# Patient Record
Sex: FEMALE | Race: ASIAN | Employment: FULL TIME | URBAN - METROPOLITAN AREA
[De-identification: names, ages, dates, MRNs, and addresses within clinical notes are randomized per-mention and may not be internally consistent; named-entity substitution may affect disease eponyms.]

---

## 2017-01-05 NOTE — PROGRESS NOTES
[facility] Internal Medicine Office Note  Chief Complaint:   Patient presents with: Follow - Up: 1 month for alprazelam.       HPI:   This is a 27year old female coming in for follow up for anxiety. She is doing well. She took alprazolam twice. kg/(m^2) as calculated from the following:    Height as of this encounter: 61\". Weight as of this encounter: 123 lb. Vital signs reviewed. Appears stated age, well groomed. Physical Exam   Vitals reviewed.    Constitutional: She appears well-develope

## 2017-07-27 ENCOUNTER — TELEPHONE (OUTPATIENT)
Dept: FAMILY MEDICINE CLINIC | Facility: CLINIC | Age: 32
End: 2017-07-27

## 2017-07-27 NOTE — TELEPHONE ENCOUNTER
Received completed Biometric screening from [facility], faxed to [phone], confirmation received     Copy mailed to pt and sent to scanning

## 2017-12-18 ENCOUNTER — OFFICE VISIT (OUTPATIENT)
Dept: FAMILY MEDICINE CLINIC | Facility: CLINIC | Age: 32
End: 2017-12-18

## 2017-12-18 ENCOUNTER — TELEPHONE (OUTPATIENT)
Dept: FAMILY MEDICINE CLINIC | Facility: CLINIC | Age: 32
End: 2017-12-18

## 2017-12-18 VITALS
TEMPERATURE: 98 F | HEART RATE: 62 BPM | DIASTOLIC BLOOD PRESSURE: 60 MMHG | HEIGHT: 62 IN | BODY MASS INDEX: 22.26 KG/M2 | WEIGHT: 121 LBS | RESPIRATION RATE: 16 BRPM | SYSTOLIC BLOOD PRESSURE: 100 MMHG

## 2017-12-18 DIAGNOSIS — Z00.00 LABORATORY EXAM ORDERED AS PART OF ROUTINE GENERAL MEDICAL EXAMINATION: ICD-10-CM

## 2017-12-18 DIAGNOSIS — F51.04 PSYCHOPHYSIOLOGICAL INSOMNIA: ICD-10-CM

## 2017-12-18 DIAGNOSIS — K12.0 CANKER SORE: ICD-10-CM

## 2017-12-18 DIAGNOSIS — Z30.09 FAMILY PLANNING: ICD-10-CM

## 2017-12-18 DIAGNOSIS — Z00.00 ROUTINE MEDICAL EXAM: Primary | ICD-10-CM

## 2017-12-18 DIAGNOSIS — G43.009 MIGRAINE WITHOUT AURA AND WITHOUT STATUS MIGRAINOSUS, NOT INTRACTABLE: ICD-10-CM

## 2017-12-18 PROCEDURE — 99385 PREV VISIT NEW AGE 18-39: CPT | Performed by: FAMILY MEDICINE

## 2017-12-18 RX ORDER — VITAMIN A, VITAMIN C, VITAMIN D-3, VITAMIN E, VITAMIN B-1, VITAMIN B-2, NIACIN, VITAMIN B-6, CALCIUM, IRON, ZINC, COPPER 4000; 120; 400; 22; 1.84; 3; 20; 10; 1; 12; 200; 27; 25; 2 [IU]/1; MG/1; [IU]/1; MG/1; MG/1; MG/1; MG/1; MG/1; MG/1; UG/1; MG/1; MG/1; MG/1; MG/1
1 TABLET ORAL DAILY
Qty: 30 TABLET | Refills: 0 | COMMUNITY
Start: 2017-12-18 | End: 2018-01-17

## 2017-12-18 RX ORDER — SUMATRIPTAN 25 MG/1
TABLET, FILM COATED ORAL
Qty: 9 TABLET | Refills: 0 | Status: SHIPPED | OUTPATIENT
Start: 2017-12-18 | End: 2019-02-07

## 2017-12-18 RX ORDER — A/SINGAPORE/GP1908/2015 IVR-180 (H1N1) (AN A/MICHIGAN/45/2015-LIKE VIRUS), A/SINGAPORE/GP2050/2015 (H3N2) (AN A/HONG KONG/4801/2014 - LIKE VIRUS), B/UTAH/9/2014 (A B/PHUKET/3073/2013-LIKE VIRUS), B/HONG KONG/259/2010 (A B/BRISBANE/60/08-LIKE VIRUS) 15; 15; 15; 15 UG/.5ML; UG/.5ML; UG/.5ML; UG/.5ML
INJECTION, SUSPENSION INTRAMUSCULAR
COMMUNITY
Start: 2017-09-30 | End: 2017-12-18

## 2017-12-18 RX ORDER — ALPRAZOLAM 0.25 MG/1
0.25 TABLET ORAL NIGHTLY PRN
Qty: 30 TABLET | Refills: 1 | Status: SHIPPED | OUTPATIENT
Start: 2017-12-18 | End: 2018-11-12

## 2017-12-18 RX ORDER — ACYCLOVIR 400 MG/1
400 TABLET ORAL 3 TIMES DAILY
Qty: 45 TABLET | Refills: 1 | Status: SHIPPED | OUTPATIENT
Start: 2017-12-18 | End: 2019-02-07

## 2017-12-18 NOTE — TELEPHONE ENCOUNTER
Patient was seen in the office today for physical, she brought in Biometric Screening Form to be completed. Fasting labs ordered today, form will be completed once labs resulted. Form placed in the pending paperwork file in triage.

## 2017-12-18 NOTE — PROGRESS NOTES
Patient presents with:  Physical: patient brought in Biometric Screening form to be completed. HPI:  Sharon Marie is a 34year old female here today for preventative visit.       Immunizations–patient is very up-to-date with her flu shot    [facility] facility-administered medications on file prior to visit.      Penicillins             Hives    Social History  Social History   Marital status: Single  Spouse name: N/A    Years of education: N/A  Number of children: N/A     Occupational History  None on f nondistended. No hepatomegaly. MUSCULOSKELETAL: Strength of upper and lower extremities 5/5 bilaterally. Normal gait. NEUROLOGIC:  Cranial nerves 2-12 grossly intact. PSYCHIATRIC:  Normal mood, affect, and hygiene. ASSESSMENT/PLAN:    1.  Routine med

## 2017-12-27 NOTE — TELEPHONE ENCOUNTER
Labs not completed -contacted pt and informed her that she needs to complete the labs in order for us to complete her paperwork, pt states she will complete them tomorrow

## 2017-12-28 ENCOUNTER — LAB ENCOUNTER (OUTPATIENT)
Dept: LAB | Age: 32
End: 2017-12-28
Attending: FAMILY MEDICINE
Payer: COMMERCIAL

## 2017-12-28 DIAGNOSIS — Z00.00 LABORATORY EXAM ORDERED AS PART OF ROUTINE GENERAL MEDICAL EXAMINATION: ICD-10-CM

## 2017-12-28 PROCEDURE — 80061 LIPID PANEL: CPT | Performed by: FAMILY MEDICINE

## 2017-12-28 PROCEDURE — 80053 COMPREHEN METABOLIC PANEL: CPT | Performed by: FAMILY MEDICINE

## 2017-12-28 PROCEDURE — 36415 COLL VENOUS BLD VENIPUNCTURE: CPT | Performed by: FAMILY MEDICINE

## 2017-12-29 NOTE — TELEPHONE ENCOUNTER
Form completed and faxed to [phone] [facility], confirmation received   PT aware, requested copy mailed to home address, confirmed address w/ pt.  Copy mailed    Copy sent to scan  Copy placed in green folder

## 2018-01-03 ENCOUNTER — OFFICE VISIT (OUTPATIENT)
Dept: OBGYN CLINIC | Facility: CLINIC | Age: 33
End: 2018-01-03

## 2018-01-03 VITALS
HEIGHT: 61.5 IN | SYSTOLIC BLOOD PRESSURE: 104 MMHG | DIASTOLIC BLOOD PRESSURE: 62 MMHG | WEIGHT: 122 LBS | BODY MASS INDEX: 22.74 KG/M2

## 2018-01-03 DIAGNOSIS — Z12.4 CERVICAL CANCER SCREENING: ICD-10-CM

## 2018-01-03 DIAGNOSIS — Z12.39 BREAST CANCER SCREENING: ICD-10-CM

## 2018-01-03 DIAGNOSIS — Z01.419 WELL WOMAN EXAM: Primary | ICD-10-CM

## 2018-01-03 PROCEDURE — 99395 PREV VISIT EST AGE 18-39: CPT | Performed by: OBSTETRICS & GYNECOLOGY

## 2018-01-03 NOTE — PROGRESS NOTES
GYN H&P     1/3/2018  10:42 AM    CC: Patient presents with:  Physical: Room 4. HPI: patient is a 34year old  here for her annual gyne exam.      Menses are regular 35-42 days, has not yet tried ovualtion kits.  Pt has not taken ocp in 1 yr, o Father      does have health issues but they are unknown   • Seizure Disorder Brother      intellectual issues   • Neurological Disorder Mother      neuropathy       Social History  Social History   Marital status: Single  Spouse name: N/A    Years of educ auscultation  CARDIOVASCULAR: normal S1, S2, RRR  BREASTS: soft, nontendder, no palpable masses or nodes, no nipple discharge, no skin changes, no axillary adenopathy  ABDOMEN: Soft, non distended; non tender, no masses  GYNE/:                        Ext

## 2018-01-07 ENCOUNTER — PATIENT MESSAGE (OUTPATIENT)
Dept: FAMILY MEDICINE CLINIC | Facility: CLINIC | Age: 33
End: 2018-01-07

## 2018-01-08 NOTE — TELEPHONE ENCOUNTER
From: Alon Suresh  To: Cooper Whiting MD  Sent: 1/7/2018 3:19 PM CST  Subject: Non-Urgent Medical Question    Dear Dr. Gabriel Kaiser,    When I saw Dr. Serjio Lino for my annual gynecological exam, she mentioned I have a hemorrhoid.  I didn't notice i

## 2018-02-04 ENCOUNTER — HOSPITAL ENCOUNTER (OUTPATIENT)
Age: 33
Discharge: HOME | End: 2018-02-04
Payer: COMMERCIAL

## 2018-02-04 VITALS
RESPIRATION RATE: 16 BRPM | SYSTOLIC BLOOD PRESSURE: 110 MMHG | OXYGEN SATURATION: 100 % | BODY MASS INDEX: 23 KG/M2 | HEART RATE: 64 BPM | DIASTOLIC BLOOD PRESSURE: 75 MMHG | TEMPERATURE: 98 F | WEIGHT: 122 LBS

## 2018-02-04 DIAGNOSIS — J06.9 UPPER RESPIRATORY TRACT INFECTION, UNSPECIFIED TYPE: ICD-10-CM

## 2018-02-04 DIAGNOSIS — J02.9 PHARYNGITIS, UNSPECIFIED ETIOLOGY: Primary | ICD-10-CM

## 2018-02-04 LAB — POCT RAPID STREP: NEGATIVE

## 2018-02-04 PROCEDURE — 99214 OFFICE O/P EST MOD 30 MIN: CPT

## 2018-02-04 PROCEDURE — 99203 OFFICE O/P NEW LOW 30 MIN: CPT

## 2018-02-04 PROCEDURE — 87081 CULTURE SCREEN ONLY: CPT | Performed by: PHYSICIAN ASSISTANT

## 2018-02-04 PROCEDURE — 87430 STREP A AG IA: CPT | Performed by: PHYSICIAN ASSISTANT

## 2018-02-04 NOTE — ED PROVIDER NOTES
Patient Seen in: THE [facility] [place]    History   Patient presents with:  Sore Throat    Stated Complaint: SORE THROAT/COUGH/FEVER X 3 DAYS     HPI    1year-old female here with complaint of postnasal drip sore throat cough with low-gra Constitutional: She is oriented to person, place, and time. She appears well-developed and well-nourished. HENT:   Head: Normocephalic and atraumatic.    Right Ear: Tympanic membrane, external ear and ear canal normal.   Left Ear: Tympanic membrane, ext diagnosis)  Upper respiratory tract infection, unspecified type    Disposition:  Discharge  2/4/2018 11:58 am    Follow-up:  Roseline Ques[address]  [address]  [phone]    Schedule an appointment as soon as poss

## 2018-02-06 ENCOUNTER — MED REC SCAN ONLY (OUTPATIENT)
Dept: FAMILY MEDICINE CLINIC | Facility: CLINIC | Age: 33
End: 2018-02-06

## 2018-05-04 ENCOUNTER — OFFICE VISIT (OUTPATIENT)
Dept: FAMILY MEDICINE CLINIC | Facility: CLINIC | Age: 33
End: 2018-05-04

## 2018-05-04 VITALS
TEMPERATURE: 97 F | SYSTOLIC BLOOD PRESSURE: 100 MMHG | WEIGHT: 125 LBS | HEART RATE: 62 BPM | BODY MASS INDEX: 23.3 KG/M2 | RESPIRATION RATE: 16 BRPM | HEIGHT: 61.5 IN | DIASTOLIC BLOOD PRESSURE: 60 MMHG

## 2018-05-04 DIAGNOSIS — K64.9 HEMORRHOIDS, UNSPECIFIED HEMORRHOID TYPE: ICD-10-CM

## 2018-05-04 DIAGNOSIS — S86.919A STRAIN OF KNEE, UNSPECIFIED LATERALITY, INITIAL ENCOUNTER: Primary | ICD-10-CM

## 2018-05-04 PROCEDURE — 99213 OFFICE O/P EST LOW 20 MIN: CPT | Performed by: FAMILY MEDICINE

## 2018-05-04 NOTE — PATIENT INSTRUCTIONS
Back Exercises: Knee Lift         To start, lie on your back with your knees bent and feet flat on the floor. Don’t press your neck or lower back to the floor. Breathe deeply.  You should feel comfortable and relaxed in this position:  · Start by tighteni · Don’t arch your back. · Stand with both feet flat on the floor, shoulder-width apart. · If you need support, steady yourself with your hand on a ledge, wall, or table. · Raise both heels so you’re standing on your toes. · Hold for ______ seconds.  Sl Begin your rehabilitation with exercises that develop muscle control. These help you meet basic goals, like driving a car or going back to work. Exercise as often as you’re advised. But stop right away if any exercise causes sharp or increasing pain.  Icing

## 2018-05-04 NOTE — PROGRESS NOTES
[facility] Visit Note  5/4/2018      Subjective:      Patient ID: Nate Kulkarni is a 28year old female. Chief Complaint:  Patient presents with:  Knee Pain: pain in both knees x 2 & 1/2 wks.  States she did start new work out plan 3/2018 th

## 2018-10-01 ENCOUNTER — MED REC SCAN ONLY (OUTPATIENT)
Dept: FAMILY MEDICINE CLINIC | Facility: CLINIC | Age: 33
End: 2018-10-01

## 2018-11-12 DIAGNOSIS — F51.04 PSYCHOPHYSIOLOGICAL INSOMNIA: ICD-10-CM

## 2018-11-12 NOTE — TELEPHONE ENCOUNTER
Requesting Alprazolam  LOV: 5/4/18  RTC: not noted  Last Relevant Labs: n/a  Filled: 12/18/17 #30 with 1 refills    Future Appointments   Date Time Provider Tee Meneses   1/17/2019 12:00 PM Darian Kapoor MD [facility] [address]     No rec

## 2018-11-13 RX ORDER — ALPRAZOLAM 0.25 MG/1
0.25 TABLET ORAL NIGHTLY PRN
Qty: 30 TABLET | Refills: 0 | Status: SHIPPED
Start: 2018-11-13 | End: 2019-02-07

## 2018-12-26 ENCOUNTER — TELEPHONE (OUTPATIENT)
Dept: OBGYN CLINIC | Facility: CLINIC | Age: 33
End: 2018-12-26

## 2018-12-26 NOTE — TELEPHONE ENCOUNTER
LMP: Nov 16  Ins: Ronal    Call back anytime    Pt has appt scheduled for her annual on   Future Appointments   Date Time Provider Tee Meneses   1/17/2019  3:00 PM Drew Pang MD [facility] [address]     Wondering if she can keep this appt

## 2019-01-15 ENCOUNTER — NURSE ONLY (OUTPATIENT)
Dept: OBGYN CLINIC | Facility: CLINIC | Age: 34
End: 2019-01-15
Payer: COMMERCIAL

## 2019-01-15 ENCOUNTER — OFFICE VISIT (OUTPATIENT)
Dept: OBGYN CLINIC | Facility: CLINIC | Age: 34
End: 2019-01-15
Payer: COMMERCIAL

## 2019-01-15 VITALS
DIASTOLIC BLOOD PRESSURE: 56 MMHG | BODY MASS INDEX: 24.6 KG/M2 | HEART RATE: 62 BPM | WEIGHT: 132 LBS | SYSTOLIC BLOOD PRESSURE: 100 MMHG | HEIGHT: 61.5 IN

## 2019-01-15 DIAGNOSIS — N91.1 SECONDARY AMENORRHEA: Primary | ICD-10-CM

## 2019-01-15 DIAGNOSIS — Z32.01 PREGNANCY EXAMINATION OR TEST, POSITIVE RESULT: ICD-10-CM

## 2019-01-15 PROCEDURE — 99213 OFFICE O/P EST LOW 20 MIN: CPT | Performed by: OBSTETRICS & GYNECOLOGY

## 2019-01-15 PROCEDURE — 76817 TRANSVAGINAL US OBSTETRIC: CPT | Performed by: OBSTETRICS & GYNECOLOGY

## 2019-01-15 RX ORDER — GLYCERIN/MINERAL OIL
1 LOTION (ML) TOPICAL DAILY
COMMUNITY

## 2019-01-15 NOTE — PROGRESS NOTES
Confirmation of pregnancy  Transvaginal  edc by lmp of 11/16/18 is 8/23/19 at [address] [place],9D  edc by u/s today 8/25/19  crl 7w6d  fhr 161  + yolk sac, adnexa nl, no free fluid    Sure lmp-EDC 08/23/19

## 2019-01-15 NOTE — PATIENT INSTRUCTIONS
Medications Safe in Pregnancy  The following over-the-counter medications may be taken safely after 12 weeks gestation by any patient who is pregnant. Please follow the instructions on the package for adult dosage.   If you experience any symptoms merced

## 2019-01-15 NOTE — PROGRESS NOTES
[address] [facility]  Obstetrics and Gynecology    Subjective:     Antonette Abbasi is a 28year old  female presents with c/o secondary amenorrhea and positive pregnancy test. The patient was recommended to return for further evaluation.  The patien All of the findings and plan were discussed with the patient. She notes understanding and agrees with the plan of care. All questions were answered to the best of my ability at this time.     RTC in 3 weeks or sooner if needed

## 2019-02-07 ENCOUNTER — INITIAL PRENATAL (OUTPATIENT)
Dept: OBGYN CLINIC | Facility: CLINIC | Age: 34
End: 2019-02-07
Payer: COMMERCIAL

## 2019-02-07 ENCOUNTER — LAB ENCOUNTER (OUTPATIENT)
Dept: LAB | Age: 34
End: 2019-02-07
Attending: FAMILY MEDICINE
Payer: COMMERCIAL

## 2019-02-07 VITALS
DIASTOLIC BLOOD PRESSURE: 64 MMHG | WEIGHT: 137 LBS | SYSTOLIC BLOOD PRESSURE: 102 MMHG | BODY MASS INDEX: 25.86 KG/M2 | HEIGHT: 61 IN | HEART RATE: 68 BPM

## 2019-02-07 DIAGNOSIS — O09.91 HIGH-RISK PREGNANCY IN FIRST TRIMESTER: ICD-10-CM

## 2019-02-07 DIAGNOSIS — O09.511 AMA (ADVANCED MATERNAL AGE) PRIMIGRAVIDA 35+, FIRST TRIMESTER: Primary | ICD-10-CM

## 2019-02-07 LAB
ANTIBODY SCREEN: NEGATIVE
BASOPHILS # BLD AUTO: 0.03 X10(3) UL (ref 0–0.2)
BASOPHILS NFR BLD AUTO: 0.4 %
DEPRECATED RDW RBC AUTO: 40.7 FL (ref 35.1–46.3)
EOSINOPHIL # BLD AUTO: 0.08 X10(3) UL (ref 0–0.7)
EOSINOPHIL NFR BLD AUTO: 1.1 %
ERYTHROCYTE [DISTWIDTH] IN BLOOD BY AUTOMATED COUNT: 12.1 % (ref 11–15)
HBV SURFACE AG SER-ACNC: <0.1 [IU]/L
HBV SURFACE AG SERPL QL IA: NONREACTIVE
HCT VFR BLD AUTO: 36.2 % (ref 35–48)
HGB BLD-MCNC: 12 G/DL (ref 12–16)
IMM GRANULOCYTES # BLD AUTO: 0.03 X10(3) UL (ref 0–1)
IMM GRANULOCYTES NFR BLD: 0.4 %
LYMPHOCYTES # BLD AUTO: 1.94 X10(3) UL (ref 1–4)
LYMPHOCYTES NFR BLD AUTO: 26.2 %
MCH RBC QN AUTO: 30.2 PG (ref 26–34)
MCHC RBC AUTO-ENTMCNC: 33.1 G/DL (ref 31–37)
MCV RBC AUTO: 91.2 FL (ref 80–100)
MONOCYTES # BLD AUTO: 0.64 X10(3) UL (ref 0.1–1)
MONOCYTES NFR BLD AUTO: 8.6 %
NEUTROPHILS # BLD AUTO: 4.68 X10 (3) UL (ref 1.5–7.7)
NEUTROPHILS # BLD AUTO: 4.68 X10(3) UL (ref 1.5–7.7)
NEUTROPHILS NFR BLD AUTO: 63.3 %
PLATELET # BLD AUTO: 225 10(3)UL (ref 150–450)
RBC # BLD AUTO: 3.97 X10(6)UL (ref 3.8–5.3)
RH BLOOD TYPE: POSITIVE
RUBV IGG SER QL: POSITIVE
RUBV IGG SER-ACNC: 212.8 IU/ML (ref 10–?)
T PALLIDUM AB SER QL IA: NONREACTIVE
WBC # BLD AUTO: 7.4 X10(3) UL (ref 4–11)

## 2019-02-07 PROCEDURE — 86850 RBC ANTIBODY SCREEN: CPT | Performed by: OBSTETRICS & GYNECOLOGY

## 2019-02-07 PROCEDURE — 36415 COLL VENOUS BLD VENIPUNCTURE: CPT | Performed by: OBSTETRICS & GYNECOLOGY

## 2019-02-07 PROCEDURE — 86901 BLOOD TYPING SEROLOGIC RH(D): CPT | Performed by: OBSTETRICS & GYNECOLOGY

## 2019-02-07 PROCEDURE — 87389 HIV-1 AG W/HIV-1&-2 AB AG IA: CPT | Performed by: OBSTETRICS & GYNECOLOGY

## 2019-02-07 PROCEDURE — 85025 COMPLETE CBC W/AUTO DIFF WBC: CPT | Performed by: OBSTETRICS & GYNECOLOGY

## 2019-02-07 PROCEDURE — 87340 HEPATITIS B SURFACE AG IA: CPT | Performed by: OBSTETRICS & GYNECOLOGY

## 2019-02-07 PROCEDURE — 86900 BLOOD TYPING SEROLOGIC ABO: CPT | Performed by: OBSTETRICS & GYNECOLOGY

## 2019-02-07 PROCEDURE — 86762 RUBELLA ANTIBODY: CPT | Performed by: OBSTETRICS & GYNECOLOGY

## 2019-02-07 PROCEDURE — 86780 TREPONEMA PALLIDUM: CPT | Performed by: OBSTETRICS & GYNECOLOGY

## 2019-02-07 PROCEDURE — 87086 URINE CULTURE/COLONY COUNT: CPT | Performed by: OBSTETRICS & GYNECOLOGY

## 2019-02-07 NOTE — PROGRESS NOTES
NOB  Scan c/w assigned EDC 1/15/19  PMH: neg  PSH: neg  Declined [facility], PAP normal 2016, will be due post partum  Prenatal care and course discussed with patient including ultrasounds, genetic testing options, frequency of visits, expected range of normal w

## 2019-02-11 ENCOUNTER — TELEPHONE (OUTPATIENT)
Dept: OBGYN CLINIC | Facility: CLINIC | Age: 34
End: 2019-02-11

## 2019-02-11 NOTE — TELEPHONE ENCOUNTER
Patient notified of negative results and male gender per request.  Copy of report mailed to patient at home address.

## 2019-02-21 ENCOUNTER — APPOINTMENT (OUTPATIENT)
Dept: LAB | Age: 34
End: 2019-02-21
Attending: FAMILY MEDICINE
Payer: COMMERCIAL

## 2019-02-21 ENCOUNTER — HOSPITAL ENCOUNTER (OUTPATIENT)
Dept: ULTRASOUND IMAGING | Age: 34
Discharge: HOME | End: 2019-02-21
Attending: FAMILY MEDICINE
Payer: COMMERCIAL

## 2019-02-21 ENCOUNTER — OFFICE VISIT (OUTPATIENT)
Dept: FAMILY MEDICINE CLINIC | Facility: CLINIC | Age: 34
End: 2019-02-21
Payer: COMMERCIAL

## 2019-02-21 VITALS
HEIGHT: 61 IN | HEART RATE: 66 BPM | BODY MASS INDEX: 26.06 KG/M2 | DIASTOLIC BLOOD PRESSURE: 70 MMHG | WEIGHT: 138 LBS | TEMPERATURE: 97 F | SYSTOLIC BLOOD PRESSURE: 104 MMHG | OXYGEN SATURATION: 99 % | RESPIRATION RATE: 16 BRPM

## 2019-02-21 DIAGNOSIS — E07.89 THYROID FULLNESS: ICD-10-CM

## 2019-02-21 DIAGNOSIS — O09.511 AMA (ADVANCED MATERNAL AGE) PRIMIGRAVIDA 35+, FIRST TRIMESTER: ICD-10-CM

## 2019-02-21 DIAGNOSIS — Z00.00 ROUTINE MEDICAL EXAM: Primary | ICD-10-CM

## 2019-02-21 DIAGNOSIS — B34.9 VIRAL SYNDROME: ICD-10-CM

## 2019-02-21 LAB
T3 SERPL-MCNC: 143 NG/DL (ref 60–181)
T4 FREE SERPL-MCNC: 1 NG/DL (ref 0.8–1.7)
TSI SER-ACNC: 1.21 MIU/ML (ref 0.36–3.74)

## 2019-02-21 PROCEDURE — 99395 PREV VISIT EST AGE 18-39: CPT | Performed by: FAMILY MEDICINE

## 2019-02-21 PROCEDURE — 76536 US EXAM OF HEAD AND NECK: CPT | Performed by: FAMILY MEDICINE

## 2019-02-21 PROCEDURE — 84439 ASSAY OF FREE THYROXINE: CPT | Performed by: FAMILY MEDICINE

## 2019-02-21 PROCEDURE — 84480 ASSAY TRIIODOTHYRONINE (T3): CPT | Performed by: FAMILY MEDICINE

## 2019-02-21 PROCEDURE — 84443 ASSAY THYROID STIM HORMONE: CPT | Performed by: FAMILY MEDICINE

## 2019-02-21 PROCEDURE — 36415 COLL VENOUS BLD VENIPUNCTURE: CPT | Performed by: FAMILY MEDICINE

## 2019-02-21 NOTE — PROGRESS NOTES
Patient presents with:  Physical: annual px  Sore Throat: x 2 days  Nasal Congestion      HPI:  Cintia Rodriguez is a 28year old female here today for preventative visit.       Imms- up to date with flu shot, will discuss tdap in her 3rd trimester      Cer Inability: Not on file      Transportation needs:        Medical: Not on file        Non-medical: Not on file    Tobacco Use      Smoking status: Former Smoker        Packs/day: 0.20        Years: 7.00        Pack years: 1.4        Types: Cigarettes have health issues but they are unknown   • Seizure Disorder Brother         intellectual issues   • Other (Neuropathy) Mother        Review of Systems - All systems reviewed and negative except for HPI    PHYSICAL EXAM:  /70   Pulse 66   Temp 97.2 °

## 2019-03-12 ENCOUNTER — ROUTINE PRENATAL (OUTPATIENT)
Dept: OBGYN CLINIC | Facility: CLINIC | Age: 34
End: 2019-03-12
Payer: COMMERCIAL

## 2019-03-12 ENCOUNTER — LAB ENCOUNTER (OUTPATIENT)
Dept: LAB | Age: 34
End: 2019-03-12
Attending: OBSTETRICS & GYNECOLOGY
Payer: COMMERCIAL

## 2019-03-12 VITALS
WEIGHT: 144 LBS | SYSTOLIC BLOOD PRESSURE: 116 MMHG | DIASTOLIC BLOOD PRESSURE: 70 MMHG | BODY MASS INDEX: 27.19 KG/M2 | HEART RATE: 76 BPM | HEIGHT: 61 IN

## 2019-03-12 DIAGNOSIS — Z34.00 SUPERVISION OF NORMAL FIRST PREGNANCY, ANTEPARTUM: ICD-10-CM

## 2019-03-12 DIAGNOSIS — Z34.00 SUPERVISION OF NORMAL FIRST PREGNANCY, ANTEPARTUM: Primary | ICD-10-CM

## 2019-03-12 DIAGNOSIS — O09.529 ANTEPARTUM MULTIGRAVIDA OF ADVANCED MATERNAL AGE: ICD-10-CM

## 2019-03-12 PROCEDURE — 82105 ALPHA-FETOPROTEIN SERUM: CPT | Performed by: OBSTETRICS & GYNECOLOGY

## 2019-03-12 PROCEDURE — 36415 COLL VENOUS BLD VENIPUNCTURE: CPT | Performed by: OBSTETRICS & GYNECOLOGY

## 2019-03-12 NOTE — PROGRESS NOTES
ELISEO.   Doing well. No complaints. Denies abdominal/pelvic pain or vaginal bleeding. Rh +   Genetic screening - neg cfDNA. AFP ordered today  AMA - MFM referral.   Weekly NSTs at 36 weeks   Prenatal labs reviewed   Going to [place] late April.    RTC in 4

## 2019-03-14 LAB
AFP SMOKING: NO
FAMILY HX NEURAL TUBE DEFECT: NO
INSULIN REQ MATERNAL DIABETES: NO
MATERNAL AGE OF DELIVERY: 35.4 YR
MOM FOR AFP: 1.07
PATIENT'S AFP: 39 NG/ML

## 2019-03-27 ENCOUNTER — PATIENT MESSAGE (OUTPATIENT)
Dept: OBGYN CLINIC | Facility: CLINIC | Age: 34
End: 2019-03-27

## 2019-03-27 ENCOUNTER — TELEPHONE (OUTPATIENT)
Dept: OBGYN CLINIC | Facility: CLINIC | Age: 34
End: 2019-03-27

## 2019-03-27 NOTE — TELEPHONE ENCOUNTER
From: Mariann Isabel  To: Anna Matta MD  Sent: 3/27/2019 8:41 AM CDT  Subject: Other    I also called the [place] office and left a message to have one of the nurses call me. I believe I pulled a muscle over the weekend.  On Monday I started experien

## 2019-03-27 NOTE — TELEPHONE ENCOUNTER
Patient called, she is 18w5d ob, on Monday, 3/25 she was cleaning around the house and she thinks she pulled her muscle upper back and for 3 days now it's been hurting. She wants to know if she can have prenatal back massage or any recommendation.

## 2019-03-27 NOTE — TELEPHONE ENCOUNTER
18W5D called c/o pain in back/chest after doing a lot of housework on . She describes pain as sharp and only with movement. Denies any chest pressure, shortness of breath, dizziness. She states the pain started Monday but has lessened.  She notic

## 2019-04-02 ENCOUNTER — OFFICE VISIT (OUTPATIENT)
Dept: FAMILY MEDICINE CLINIC | Facility: CLINIC | Age: 34
End: 2019-04-02
Payer: COMMERCIAL

## 2019-04-02 VITALS
TEMPERATURE: 97 F | HEIGHT: 61 IN | SYSTOLIC BLOOD PRESSURE: 100 MMHG | RESPIRATION RATE: 16 BRPM | WEIGHT: 148 LBS | HEART RATE: 74 BPM | BODY MASS INDEX: 27.94 KG/M2 | DIASTOLIC BLOOD PRESSURE: 70 MMHG

## 2019-04-02 DIAGNOSIS — R20.0 NUMBNESS AND TINGLING OF LEFT UPPER EXTREMITY: Primary | ICD-10-CM

## 2019-04-02 DIAGNOSIS — R20.2 NUMBNESS AND TINGLING OF LEFT UPPER EXTREMITY: Primary | ICD-10-CM

## 2019-04-02 DIAGNOSIS — R20.2 PARESTHESIA OF LEFT LOWER EXTREMITY: ICD-10-CM

## 2019-04-02 PROCEDURE — 99213 OFFICE O/P EST LOW 20 MIN: CPT | Performed by: FAMILY MEDICINE

## 2019-04-02 NOTE — PROGRESS NOTES
[facility] Visit Note  4/2/2019      Subjective:      Patient ID: Gwcraig Miranda is a 27year old female. Chief Complaint:  Patient presents with: Other: \"wierd sensation in her left leg & left arm\" x 1 & 1/2 week.       HPI:  [place] Failing locations and hope this will improve after delivery  -discussed if sxs worsen could consider a medrol dose susan (not before her glucose tolerance test though), but if this was not helpful to consider x-rays    2.  Paresthesia of left lower extremity  -as abo

## 2019-04-02 NOTE — PATIENT INSTRUCTIONS
Reach and Hold Exercise       Do this exercise on your hands and knees. Keep your knees under your hips and your hands under your shoulders. Keep your spine in a neutral position (not arched or sagging). Keep your ears in line with your shoulders.  Hold f To start, stand tall with your ears, shoulders, and hips in line. Your feet should be slightly apart, positioned just under your hips. Focus your eyes directly in front of you.  this position for a few seconds before starting your exercise.  This he · Hold for 10 seconds. Return to starting position.   · Repeat 5 times.   For your safety, check with your healthcare provider before starting an exercise program.   Date Last Reviewed: 11/1/2017  © 4970-1277 [employer].  [facility] © 4090-0812 [employer]. [facility] [place], [address] [place]. All rights reserved. This information is not intended as a substitute for professional medical care. Always follow your healthcare professional's instructions.         Neck [employer] © 0665-9859 [employer] [address]. 140[address] [facility], [facility] [place]. All rights reserved. This information is not intended as a substitute for professional medical care. Always follow your healthcare professional's instructions.         Exercis 3. Abdominal contraction: Bend your knees and put your hands on your stomach. Tighten your stomach muscles. Hold for 5 seconds, then relax. Repeat 10 times. 4. Straight leg raise: Bend one leg at the knee and keep the other leg straight.  Tighten your stom 3. Pelvic tilt: Lie on the floor on your back with your knees bent at 90 degrees. Your feet should be flat on the floor. Inhale, exhale, then slowly contract your abdominal muscles bringing your navel toward your spine.  Let your pelvis rock back until your Do this exercise on your hands and knees. Keep your knees under your hips and your hands under your shoulders. · Relax your abdominal and buttocks muscles, lift your head, and let your back sag. Be sure to keep your weight evenly distributed.  Don’t si For your safety, check with your healthcare provider before starting an exercise program.   Date Last Reviewed: 3/1/2018  © 8234-4300 [employer] [address]. 1407 [facility], [address] [place] [address]. All rights reserved.  This information is not intende

## 2019-04-08 NOTE — PROGRESS NOTES
Outpatient Maternal-Fetal Medicine Consultation    Dear Dr. Nancy Dixon    Thank you for requesting ultrasound evaluation and maternal fetal medicine consultation on your patient Vy Velazquez.   As you are aware she is a 27year old female  with a singl calculation of the gestational age by current scan was based on BPD, OFD, HC, TCD, AC, FL and HUM.  ____________________________________________________________________________  Anatomy Scan:  [place] gestation.   Biometry:  BPD 51.3 mm 85th% 21w4d (21w0d following issues:  ADVANCED MATERNAL AGE    Background  I reviewed with the patient that pregnancies in women of advanced maternal age (28 or older at delivery) are associated with elevated risks.   Specifically, there is a higher rate of:    · Fetal malfor mid-trimester sonography was reviewed with the patient. First trimester screening and second trimester multiple-marker serum serum screening as alternative aneuploidy screening options were also reviewed.   However, both of these tests are associated with

## 2019-04-09 ENCOUNTER — OFFICE VISIT (OUTPATIENT)
Dept: PERINATAL CARE | Facility: HOSPITAL | Age: 34
End: 2019-04-09
Attending: OBSTETRICS & GYNECOLOGY
Payer: COMMERCIAL

## 2019-04-09 ENCOUNTER — TELEPHONE (OUTPATIENT)
Dept: OBGYN CLINIC | Facility: CLINIC | Age: 34
End: 2019-04-09

## 2019-04-09 VITALS
HEART RATE: 80 BPM | DIASTOLIC BLOOD PRESSURE: 64 MMHG | BODY MASS INDEX: 27.94 KG/M2 | WEIGHT: 148 LBS | HEIGHT: 61 IN | SYSTOLIC BLOOD PRESSURE: 98 MMHG

## 2019-04-09 DIAGNOSIS — O09.512 AMA (ADVANCED MATERNAL AGE) PRIMIGRAVIDA 35+, SECOND TRIMESTER: ICD-10-CM

## 2019-04-09 PROCEDURE — 76811 OB US DETAILED SNGL FETUS: CPT | Performed by: OBSTETRICS & GYNECOLOGY

## 2019-04-09 PROCEDURE — 99243 OFF/OP CNSLTJ NEW/EST LOW 30: CPT | Performed by: OBSTETRICS & GYNECOLOGY

## 2019-04-09 NOTE — TELEPHONE ENCOUNTER
FMLA forms signed. Patient requests that we mail it to her. Sent. Copy sent to scanning. Copy in [place].

## 2019-04-09 NOTE — TELEPHONE ENCOUNTER
Patient dropped off FMLA forms to be completed. Fee was paid. Please call patient once these are complete and she will . This was put in Nurse mailbox in [place].

## 2019-04-18 ENCOUNTER — ROUTINE PRENATAL (OUTPATIENT)
Dept: OBGYN CLINIC | Facility: CLINIC | Age: 34
End: 2019-04-18
Payer: COMMERCIAL

## 2019-04-18 VITALS — WEIGHT: 153.81 LBS | DIASTOLIC BLOOD PRESSURE: 64 MMHG | BODY MASS INDEX: 29 KG/M2 | SYSTOLIC BLOOD PRESSURE: 102 MMHG

## 2019-04-18 DIAGNOSIS — O09.512 SUPERVISION OF ELDERLY PRIMIGRAVIDA IN SECOND TRIMESTER: ICD-10-CM

## 2019-04-18 DIAGNOSIS — Z3A.21 21 WEEKS GESTATION OF PREGNANCY: Primary | ICD-10-CM

## 2019-04-18 NOTE — PROGRESS NOTES
ELISEO  Doing well.   Denies LOF/VB/uctx.  + fetal movement   RH pos  S/P Anatomy scan Level II with MFM, follow up growth ultrasound at 32 weeks, weeky NSTs at 36 weeks  1 hr glucose and CBC (24-28wks) instructions given  RTC 4 weeks    Michael Sanders RN, F

## 2019-05-13 ENCOUNTER — LAB ENCOUNTER (OUTPATIENT)
Dept: LAB | Age: 34
End: 2019-05-13
Attending: OBSTETRICS & GYNECOLOGY
Payer: COMMERCIAL

## 2019-05-13 DIAGNOSIS — Z3A.21 21 WEEKS GESTATION OF PREGNANCY: ICD-10-CM

## 2019-05-13 DIAGNOSIS — O09.512 SUPERVISION OF ELDERLY PRIMIGRAVIDA IN SECOND TRIMESTER: ICD-10-CM

## 2019-05-13 PROCEDURE — 85025 COMPLETE CBC W/AUTO DIFF WBC: CPT | Performed by: OBSTETRICS & GYNECOLOGY

## 2019-05-13 PROCEDURE — 82950 GLUCOSE TEST: CPT | Performed by: OBSTETRICS & GYNECOLOGY

## 2019-05-13 PROCEDURE — 36415 COLL VENOUS BLD VENIPUNCTURE: CPT | Performed by: OBSTETRICS & GYNECOLOGY

## 2019-05-17 ENCOUNTER — ROUTINE PRENATAL (OUTPATIENT)
Dept: OBGYN CLINIC | Facility: CLINIC | Age: 34
End: 2019-05-17
Payer: COMMERCIAL

## 2019-05-17 VITALS — DIASTOLIC BLOOD PRESSURE: 68 MMHG | WEIGHT: 161.19 LBS | BODY MASS INDEX: 30 KG/M2 | SYSTOLIC BLOOD PRESSURE: 106 MMHG

## 2019-05-17 DIAGNOSIS — Z3A.26 26 WEEKS GESTATION OF PREGNANCY: ICD-10-CM

## 2019-05-17 DIAGNOSIS — O09.512 SUPERVISION OF ELDERLY PRIMIGRAVIDA IN SECOND TRIMESTER: Primary | ICD-10-CM

## 2019-05-17 DIAGNOSIS — O09.529 ANTEPARTUM MULTIGRAVIDA OF ADVANCED MATERNAL AGE: ICD-10-CM

## 2019-05-17 NOTE — PROGRESS NOTES
ELISEO  Doing well. FM is good. Denies VB/LOF/uctx or cramping  RH Pos  Passed 1 hour glucose. Pt is traveling to [place] in 1 week. Travel safety and precautions discussed. Discussed kick counts and when to call office.  Pt and spouse verbalized understanding

## 2019-05-31 ENCOUNTER — TELEPHONE (OUTPATIENT)
Dept: OBGYN CLINIC | Facility: CLINIC | Age: 34
End: 2019-05-31

## 2019-05-31 NOTE — TELEPHONE ENCOUNTER
She should have a BP check and evaluation to be safe. It could be a benign rash of pregnancy but to be safe it is best to be evaluated.

## 2019-05-31 NOTE — TELEPHONE ENCOUNTER
28W0D called c/o itching on her abdomen. She states she also has red bumps all over. States it started the last few days. Also had some itching on her feet as well. Denies any OB symptoms.   Last OV:   Pregnancy Complications: NA  Abdominal cora

## 2019-05-31 NOTE — TELEPHONE ENCOUNTER
Patient advised of recommendations. She is out of town until Tuesday. Appointment scheduled for then. Advised patient if her symptoms worsen or any new s/s arise such as severe headache, visual changes, N/V she should report to ER.  She verbalized [place]

## 2019-06-04 ENCOUNTER — ROUTINE PRENATAL (OUTPATIENT)
Dept: OBGYN CLINIC | Facility: CLINIC | Age: 34
End: 2019-06-04
Payer: COMMERCIAL

## 2019-06-04 ENCOUNTER — OFFICE VISIT (OUTPATIENT)
Dept: FAMILY MEDICINE CLINIC | Facility: CLINIC | Age: 34
End: 2019-06-04
Payer: COMMERCIAL

## 2019-06-04 ENCOUNTER — LAB ENCOUNTER (OUTPATIENT)
Dept: LAB | Age: 34
End: 2019-06-04
Attending: NURSE PRACTITIONER
Payer: COMMERCIAL

## 2019-06-04 VITALS
HEIGHT: 61 IN | WEIGHT: 161 LBS | HEART RATE: 68 BPM | DIASTOLIC BLOOD PRESSURE: 70 MMHG | TEMPERATURE: 98 F | BODY MASS INDEX: 30.4 KG/M2 | RESPIRATION RATE: 16 BRPM | SYSTOLIC BLOOD PRESSURE: 110 MMHG

## 2019-06-04 VITALS
WEIGHT: 162.38 LBS | SYSTOLIC BLOOD PRESSURE: 98 MMHG | DIASTOLIC BLOOD PRESSURE: 62 MMHG | BODY MASS INDEX: 30.66 KG/M2 | HEIGHT: 61 IN

## 2019-06-04 DIAGNOSIS — R21 RASH AND NONSPECIFIC SKIN ERUPTION: ICD-10-CM

## 2019-06-04 DIAGNOSIS — R21 RASH AND NONSPECIFIC SKIN ERUPTION: Primary | ICD-10-CM

## 2019-06-04 DIAGNOSIS — R21 RASH AND OTHER NONSPECIFIC SKIN ERUPTION: Primary | ICD-10-CM

## 2019-06-04 DIAGNOSIS — M25.572 ACUTE LEFT ANKLE PAIN: Primary | ICD-10-CM

## 2019-06-04 PROCEDURE — 80053 COMPREHEN METABOLIC PANEL: CPT

## 2019-06-04 PROCEDURE — 85025 COMPLETE CBC W/AUTO DIFF WBC: CPT

## 2019-06-04 PROCEDURE — 99213 OFFICE O/P EST LOW 20 MIN: CPT | Performed by: PHYSICIAN ASSISTANT

## 2019-06-04 PROCEDURE — 83789 MASS SPECTROMETRY QUAL/QUAN: CPT

## 2019-06-04 PROCEDURE — 36415 COLL VENOUS BLD VENIPUNCTURE: CPT

## 2019-06-04 NOTE — PROGRESS NOTES
[facility] Internal Medicine Progress Note    CC:  Patient presents with: Ankle Pain: left ankle pain for over a wk.        HPI:   HPI  Within the last 2 weeks, she was at a wedding in [place]  She got shoes with better arch support because she is Constitutional: She is oriented to person, place, and time and well-developed, well-nourished, and in no distress. Cardiovascular: Normal rate, regular rhythm and normal heart sounds. Exam reveals no gallop and no friction rub. No murmur heard.   Pulmon

## 2019-06-04 NOTE — PROGRESS NOTES
Patient was traveling last week and started to have a rash on her abdomen. She had a day where the rash extended to her right flank, large hives. She does have a history of hives and allergies.  She has been using her regular lotion and the rash seems to h

## 2019-06-04 NOTE — PATIENT INSTRUCTIONS
Thank you for choosing Hermes Mccoy PA-C at [place] [address]  To Do: Eliot Lora  1. Ice, elevate  2. OTC Acetaminophen as needed  3.  Follow-up if symptoms persist or increase    • Please signup for MY CHART, which is electronic access to you your testing, please call Central Scheduling at [phone]  Please allow our office 5 business days to contact you regarding any testing results.     Refill policies:   Allow 3 business days for refills; controlled substances may take longer and must be p

## 2019-06-11 ENCOUNTER — TELEPHONE (OUTPATIENT)
Dept: OBGYN CLINIC | Facility: CLINIC | Age: 34
End: 2019-06-11

## 2019-06-11 NOTE — TELEPHONE ENCOUNTER
13X6N called stating she was scratched by her cat. He is indoors only (the last 4 years she has had him). She states it did not break the skin, no bleeding. Denies any other s/s.   Last OV:   Pregnancy Complications: NA  Abdominal pain: No  Rowan

## 2019-06-13 ENCOUNTER — TELEPHONE (OUTPATIENT)
Dept: OBGYN CLINIC | Facility: CLINIC | Age: 34
End: 2019-06-13

## 2019-06-13 NOTE — TELEPHONE ENCOUNTER
Patient called stating she notice different in size on labia on one side and not sure if normal in pregnancy     Thank you

## 2019-06-13 NOTE — TELEPHONE ENCOUNTER
Pt reports labial swelling on one side; first noticed today. Pt also reports one side of labia feels \"dry\". Pt denies any redness, burning, irritation, discharge or odor.   Pt advised some swelling is normal.  Continue to observe and can be examined at

## 2019-06-19 ENCOUNTER — ROUTINE PRENATAL (OUTPATIENT)
Dept: OBGYN CLINIC | Facility: CLINIC | Age: 34
End: 2019-06-19
Payer: COMMERCIAL

## 2019-06-19 ENCOUNTER — APPOINTMENT (OUTPATIENT)
Dept: LAB | Age: 34
End: 2019-06-19
Attending: OBSTETRICS & GYNECOLOGY
Payer: COMMERCIAL

## 2019-06-19 VITALS
HEIGHT: 61 IN | WEIGHT: 165.63 LBS | BODY MASS INDEX: 31.27 KG/M2 | SYSTOLIC BLOOD PRESSURE: 110 MMHG | DIASTOLIC BLOOD PRESSURE: 64 MMHG

## 2019-06-19 DIAGNOSIS — O09.529 ANTEPARTUM MULTIGRAVIDA OF ADVANCED MATERNAL AGE: ICD-10-CM

## 2019-06-19 DIAGNOSIS — Z23 NEED FOR VACCINATION: ICD-10-CM

## 2019-06-19 DIAGNOSIS — Z34.93 ENCOUNTER FOR SUPERVISION OF NORMAL PREGNANCY IN THIRD TRIMESTER, UNSPECIFIED GRAVIDITY: Primary | ICD-10-CM

## 2019-06-19 PROCEDURE — 90715 TDAP VACCINE 7 YRS/> IM: CPT | Performed by: OBSTETRICS & GYNECOLOGY

## 2019-06-19 PROCEDURE — 90471 IMMUNIZATION ADMIN: CPT | Performed by: OBSTETRICS & GYNECOLOGY

## 2019-06-19 NOTE — PATIENT INSTRUCTIONS
Tdap Vaccine: What You Need To Know    1. Why Get Vaccinated:    · Tetanus, diphtheria, and pertussis can be very serious diseases, even for adolescents and adults. Tdap vaccine can protect us from these diseases.     · Tetanus (Lockjaw) causes painful mu tetanus infection. Medications Safe in Pregnancy  The following over-the-counter medications may be taken safely after 12 weeks gestation by any patient who is pregnant. Please follow the instructions on the package for adult dosage.   If yo

## 2019-07-01 NOTE — PROGRESS NOTES
Shirley Gill    Dear Dr. Karlie Garcia    Thank you for requesting ultrasound evaluation and maternal fetal medicine consultation on your patient Loretta Dickson.   As you are aware she is a 27year old female  with a singleto appearance: 4 chamber heart and great vessels, bladder. Brain: Visualized and normal appearance: cerebellum. Gastrointestinal Tract: stomach visible. Summary of Ultrasound Findings:  Impression:  The fetal growth is appropriate    ___________________

## 2019-07-02 ENCOUNTER — OFFICE VISIT (OUTPATIENT)
Dept: PERINATAL CARE | Facility: HOSPITAL | Age: 34
End: 2019-07-02
Attending: OBSTETRICS & GYNECOLOGY
Payer: COMMERCIAL

## 2019-07-02 VITALS
HEIGHT: 61 IN | DIASTOLIC BLOOD PRESSURE: 83 MMHG | BODY MASS INDEX: 31.15 KG/M2 | HEART RATE: 90 BPM | WEIGHT: 165 LBS | SYSTOLIC BLOOD PRESSURE: 118 MMHG

## 2019-07-02 DIAGNOSIS — O09.512 AMA (ADVANCED MATERNAL AGE) PRIMIGRAVIDA 35+, SECOND TRIMESTER: ICD-10-CM

## 2019-07-02 PROCEDURE — 76816 OB US FOLLOW-UP PER FETUS: CPT | Performed by: OBSTETRICS & GYNECOLOGY

## 2019-07-02 PROCEDURE — 76819 FETAL BIOPHYS PROFIL W/O NST: CPT | Performed by: OBSTETRICS & GYNECOLOGY

## 2019-07-02 PROCEDURE — 99213 OFFICE O/P EST LOW 20 MIN: CPT | Performed by: OBSTETRICS & GYNECOLOGY

## 2019-07-05 ENCOUNTER — ROUTINE PRENATAL (OUTPATIENT)
Dept: OBGYN CLINIC | Facility: CLINIC | Age: 34
End: 2019-07-05
Payer: COMMERCIAL

## 2019-07-05 VITALS
HEIGHT: 61 IN | BODY MASS INDEX: 32.1 KG/M2 | DIASTOLIC BLOOD PRESSURE: 64 MMHG | WEIGHT: 170 LBS | SYSTOLIC BLOOD PRESSURE: 98 MMHG

## 2019-07-05 DIAGNOSIS — O09.513 SUPERVISION OF ELDERLY PRIMIGRAVIDA IN THIRD TRIMESTER: ICD-10-CM

## 2019-07-05 DIAGNOSIS — Z3A.33 33 WEEKS GESTATION OF PREGNANCY: Primary | ICD-10-CM

## 2019-07-05 NOTE — PATIENT INSTRUCTIONS
FETAL MOVEMENT CHART    Begin counting fetal movements at 32 weeks of pregnancy. You may find that your baby is more active after eating or drinking. We want you to time how long it takes to feel 10 movements (kicks, flutters, swishes or rolls).   Rudy Casarez

## 2019-07-05 NOTE — PROGRESS NOTES
ELISEO  Doing well, +FM  Denies VB/LOF/uctx  Rh +, TDAP received, EPDS  gbbs on rtc  RTC in 2 wks  Fetal movement instructions given

## 2019-07-19 ENCOUNTER — ROUTINE PRENATAL (OUTPATIENT)
Dept: OBGYN CLINIC | Facility: CLINIC | Age: 34
End: 2019-07-19
Payer: COMMERCIAL

## 2019-07-19 VITALS — SYSTOLIC BLOOD PRESSURE: 100 MMHG | BODY MASS INDEX: 33 KG/M2 | DIASTOLIC BLOOD PRESSURE: 64 MMHG | WEIGHT: 176 LBS

## 2019-07-19 DIAGNOSIS — Z34.03 ENCOUNTER FOR SUPERVISION OF NORMAL FIRST PREGNANCY IN THIRD TRIMESTER: ICD-10-CM

## 2019-07-19 DIAGNOSIS — O09.529 ANTEPARTUM MULTIGRAVIDA OF ADVANCED MATERNAL AGE: Primary | ICD-10-CM

## 2019-07-19 PROBLEM — O09.512 AMA (ADVANCED MATERNAL AGE) PRIMIGRAVIDA 35+, SECOND TRIMESTER: Status: RESOLVED | Noted: 2019-02-07 | Resolved: 2019-07-19

## 2019-07-19 PROBLEM — O09.512 AMA (ADVANCED MATERNAL AGE) PRIMIGRAVIDA 35+, SECOND TRIMESTER (HCC): Status: RESOLVED | Noted: 2019-02-07 | Resolved: 2019-07-19

## 2019-07-19 NOTE — PATIENT INSTRUCTIONS
Labor Instructions    How do I know if it’s true labor? • One of the most important aspects of any pregnancy is being able to recognize the onset of labor.   Unfortunately, on occasion it can be difficult or confusing, especially if you have had one or mor of the contractions. Having regular (usually closer), longer lasting (35-70 seconds), and sharper (more painful) contractions are the common symptoms of actual labor.   The second way in which labor can begin which occurs in approximately 30% of all patien the room during your labor. During the delivery, the nurses will inform you of the hospital policy and how many coaches are allowed. You may desire pain medication or anesthesia for pain.   You probably discussed some aspects of pain medication with us dur Please call the office within a few days after you are discharged from the hospital to schedule your post-partum visit, which is usually 4-6 weeks after delivery. Any medications necessary will be discussed on an individual basis.   If you decide to crys S                                                                                                                     Time M T W  F S S                                                                                                           Time M T W T

## 2019-07-19 NOTE — PROGRESS NOTES
ELISEO  Doing well, +FM  Denies LOF/VB/uctx  Mode of delivery:  anticipated  SVE deferred   GBS at next visit   Fetal movement count given  AMA: s/p MFM consult with Level 2 Us and growth scan at 32 wks.  weekly NST at 36 wks      RTC 1 week with NST

## 2019-07-26 ENCOUNTER — ROUTINE PRENATAL (OUTPATIENT)
Dept: OBGYN CLINIC | Facility: CLINIC | Age: 34
End: 2019-07-26
Payer: COMMERCIAL

## 2019-07-26 ENCOUNTER — APPOINTMENT (OUTPATIENT)
Dept: OBGYN CLINIC | Facility: CLINIC | Age: 34
End: 2019-07-26
Payer: COMMERCIAL

## 2019-07-26 VITALS — DIASTOLIC BLOOD PRESSURE: 70 MMHG | WEIGHT: 180 LBS | SYSTOLIC BLOOD PRESSURE: 118 MMHG | BODY MASS INDEX: 34 KG/M2

## 2019-07-26 DIAGNOSIS — Z36.9 PRENATAL SCREENING ENCOUNTER: ICD-10-CM

## 2019-07-26 DIAGNOSIS — O09.529 ANTEPARTUM MULTIGRAVIDA OF ADVANCED MATERNAL AGE: Primary | ICD-10-CM

## 2019-07-26 LAB — MULTISTIX LOT#: NORMAL NUMERIC

## 2019-07-26 PROCEDURE — 59025 FETAL NON-STRESS TEST: CPT | Performed by: OBSTETRICS & GYNECOLOGY

## 2019-07-26 PROCEDURE — 81002 URINALYSIS NONAUTO W/O SCOPE: CPT | Performed by: OBSTETRICS & GYNECOLOGY

## 2019-08-02 ENCOUNTER — ROUTINE PRENATAL (OUTPATIENT)
Dept: OBGYN CLINIC | Facility: CLINIC | Age: 34
End: 2019-08-02
Payer: COMMERCIAL

## 2019-08-02 ENCOUNTER — APPOINTMENT (OUTPATIENT)
Dept: OBGYN CLINIC | Facility: CLINIC | Age: 34
End: 2019-08-02
Payer: COMMERCIAL

## 2019-08-02 VITALS
BODY MASS INDEX: 34.36 KG/M2 | WEIGHT: 182 LBS | DIASTOLIC BLOOD PRESSURE: 66 MMHG | SYSTOLIC BLOOD PRESSURE: 118 MMHG | HEIGHT: 61 IN

## 2019-08-02 DIAGNOSIS — O09.529 ANTEPARTUM MULTIGRAVIDA OF ADVANCED MATERNAL AGE: ICD-10-CM

## 2019-08-02 DIAGNOSIS — Z36.9 PRENATAL SCREENING ENCOUNTER: Primary | ICD-10-CM

## 2019-08-02 DIAGNOSIS — Z34.03 ENCOUNTER FOR SUPERVISION OF NORMAL FIRST PREGNANCY IN THIRD TRIMESTER: ICD-10-CM

## 2019-08-02 LAB — MULTISTIX LOT#: NORMAL NUMERIC

## 2019-08-02 PROCEDURE — 59025 FETAL NON-STRESS TEST: CPT | Performed by: NURSE PRACTITIONER

## 2019-08-02 PROCEDURE — 81002 URINALYSIS NONAUTO W/O SCOPE: CPT | Performed by: NURSE PRACTITIONER

## 2019-08-02 NOTE — PROGRESS NOTES
ELISEO  Doing well, +FM  Denies VB/LOF/uctx  Mode of delivery:  anticipated  Labor precautions discussed  SVE deferred  RTC 1 week with NST  NST reactive

## 2019-08-08 ENCOUNTER — ROUTINE PRENATAL (OUTPATIENT)
Dept: OBGYN CLINIC | Facility: CLINIC | Age: 34
End: 2019-08-08
Payer: COMMERCIAL

## 2019-08-08 ENCOUNTER — APPOINTMENT (OUTPATIENT)
Dept: OBGYN CLINIC | Facility: CLINIC | Age: 34
End: 2019-08-08
Payer: COMMERCIAL

## 2019-08-08 VITALS — DIASTOLIC BLOOD PRESSURE: 74 MMHG | BODY MASS INDEX: 35 KG/M2 | SYSTOLIC BLOOD PRESSURE: 112 MMHG | WEIGHT: 183.19 LBS

## 2019-08-08 DIAGNOSIS — Z36.9 PRENATAL SCREENING ENCOUNTER: ICD-10-CM

## 2019-08-08 DIAGNOSIS — O09.529 ANTEPARTUM MULTIGRAVIDA OF ADVANCED MATERNAL AGE: Primary | ICD-10-CM

## 2019-08-08 LAB — MULTISTIX LOT#: NORMAL NUMERIC

## 2019-08-08 PROCEDURE — 81002 URINALYSIS NONAUTO W/O SCOPE: CPT | Performed by: OBSTETRICS & GYNECOLOGY

## 2019-08-08 PROCEDURE — 59025 FETAL NON-STRESS TEST: CPT | Performed by: OBSTETRICS & GYNECOLOGY

## 2019-08-16 ENCOUNTER — ROUTINE PRENATAL (OUTPATIENT)
Dept: OBGYN CLINIC | Facility: CLINIC | Age: 34
End: 2019-08-16
Payer: COMMERCIAL

## 2019-08-16 ENCOUNTER — APPOINTMENT (OUTPATIENT)
Dept: OBGYN CLINIC | Facility: CLINIC | Age: 34
End: 2019-08-16
Payer: COMMERCIAL

## 2019-08-16 VITALS — SYSTOLIC BLOOD PRESSURE: 112 MMHG | WEIGHT: 185 LBS | BODY MASS INDEX: 35 KG/M2 | DIASTOLIC BLOOD PRESSURE: 64 MMHG

## 2019-08-16 DIAGNOSIS — O09.513 AMA (ADVANCED MATERNAL AGE) PRIMIGRAVIDA 35+, THIRD TRIMESTER: ICD-10-CM

## 2019-08-16 DIAGNOSIS — Z36.9 PRENATAL SCREENING ENCOUNTER: Primary | ICD-10-CM

## 2019-08-16 DIAGNOSIS — Z34.03 ENCOUNTER FOR SUPERVISION OF NORMAL FIRST PREGNANCY IN THIRD TRIMESTER: ICD-10-CM

## 2019-08-16 PROBLEM — O09.529 ANTEPARTUM MULTIGRAVIDA OF ADVANCED MATERNAL AGE (HCC): Status: RESOLVED | Noted: 2019-06-19 | Resolved: 2019-08-16

## 2019-08-16 PROBLEM — O09.529 ANTEPARTUM MULTIGRAVIDA OF ADVANCED MATERNAL AGE: Status: RESOLVED | Noted: 2019-06-19 | Resolved: 2019-08-16

## 2019-08-16 LAB
MULTISTIX EXPIRATION DATE: NORMAL DATE
MULTISTIX LOT#: NORMAL NUMERIC

## 2019-08-16 PROCEDURE — 59025 FETAL NON-STRESS TEST: CPT | Performed by: NURSE PRACTITIONER

## 2019-08-16 PROCEDURE — 81002 URINALYSIS NONAUTO W/O SCOPE: CPT | Performed by: NURSE PRACTITIONER

## 2019-08-16 NOTE — PROGRESS NOTES
ELISEO  Doing well, +FM  Denies VB/LOF/uctx  Mode of delivery:  anticipated  Will look for IOL at the next visit, patient prefers to defer  Labor precautions discussed  SVE deferred  RTC 1 week with NST  NST reactive

## 2019-08-20 ENCOUNTER — HOSPITAL ENCOUNTER (INPATIENT)
Facility: HOSPITAL | Age: 34
LOS: 2 days | Discharge: HOME | End: 2019-08-22
Attending: OBSTETRICS & GYNECOLOGY | Admitting: OBSTETRICS & GYNECOLOGY
Payer: COMMERCIAL

## 2019-08-20 PROBLEM — Z34.90 PREGNANCY: Status: ACTIVE | Noted: 2019-08-20

## 2019-08-20 PROBLEM — Z34.90 PREGNANCY (HCC): Status: ACTIVE | Noted: 2019-08-20

## 2019-08-20 LAB
ANTIBODY SCREEN: NEGATIVE
DEPRECATED RDW RBC AUTO: 48.9 FL (ref 35.1–46.3)
ERYTHROCYTE [DISTWIDTH] IN BLOOD BY AUTOMATED COUNT: 16.1 % (ref 11–15)
HCT VFR BLD AUTO: 35.4 % (ref 35–48)
HGB BLD-MCNC: 11.5 G/DL (ref 12–16)
MCH RBC QN AUTO: 27.4 PG (ref 26–34)
MCHC RBC AUTO-ENTMCNC: 32.5 G/DL (ref 31–37)
MCV RBC AUTO: 84.3 FL (ref 80–100)
PLATELET # BLD AUTO: 228 10(3)UL (ref 150–450)
RBC # BLD AUTO: 4.2 X10(6)UL (ref 3.8–5.3)
RH BLOOD TYPE: POSITIVE
T PALLIDUM AB SER QL IA: NONREACTIVE
WBC # BLD AUTO: 7.1 X10(3) UL (ref 4–11)

## 2019-08-20 PROCEDURE — 0UQMXZZ REPAIR VULVA, EXTERNAL APPROACH: ICD-10-PCS | Performed by: OBSTETRICS & GYNECOLOGY

## 2019-08-20 PROCEDURE — 0KQM0ZZ REPAIR PERINEUM MUSCLE, OPEN APPROACH: ICD-10-PCS | Performed by: OBSTETRICS & GYNECOLOGY

## 2019-08-20 PROCEDURE — 59400 OBSTETRICAL CARE: CPT | Performed by: OBSTETRICS & GYNECOLOGY

## 2019-08-20 RX ORDER — SODIUM CHLORIDE, SODIUM LACTATE, POTASSIUM CHLORIDE, CALCIUM CHLORIDE 600; 310; 30; 20 MG/100ML; MG/100ML; MG/100ML; MG/100ML
INJECTION, SOLUTION INTRAVENOUS CONTINUOUS
Status: DISCONTINUED | OUTPATIENT
Start: 2019-08-20 | End: 2019-08-20 | Stop reason: HOSPADM

## 2019-08-20 RX ORDER — TERBUTALINE SULFATE 1 MG/ML
0.25 INJECTION, SOLUTION SUBCUTANEOUS AS NEEDED
Status: DISCONTINUED | OUTPATIENT
Start: 2019-08-20 | End: 2019-08-20 | Stop reason: HOSPADM

## 2019-08-20 RX ORDER — DIPHENHYDRAMINE HCL 25 MG
25 CAPSULE ORAL EVERY 6 HOURS PRN
Status: DISCONTINUED | OUTPATIENT
Start: 2019-08-20 | End: 2019-08-22

## 2019-08-20 RX ORDER — EPHEDRINE SULFATE/0.9% NACL/PF 25 MG/5 ML
5 SYRINGE (ML) INTRAVENOUS AS NEEDED
Status: DISCONTINUED | OUTPATIENT
Start: 2019-08-20 | End: 2019-08-20

## 2019-08-20 RX ORDER — ACETAMINOPHEN 325 MG/1
650 TABLET ORAL EVERY 6 HOURS PRN
Status: DISCONTINUED | OUTPATIENT
Start: 2019-08-20 | End: 2019-08-22

## 2019-08-20 RX ORDER — NALBUPHINE HCL 10 MG/ML
2.5 AMPUL (ML) INJECTION
Status: DISCONTINUED | OUTPATIENT
Start: 2019-08-20 | End: 2019-08-20

## 2019-08-20 RX ORDER — ONDANSETRON 2 MG/ML
4 INJECTION INTRAMUSCULAR; INTRAVENOUS EVERY 6 HOURS PRN
Status: DISCONTINUED | OUTPATIENT
Start: 2019-08-20 | End: 2019-08-20 | Stop reason: HOSPADM

## 2019-08-20 RX ORDER — SIMETHICONE 80 MG
80 TABLET,CHEWABLE ORAL 3 TIMES DAILY PRN
Status: DISCONTINUED | OUTPATIENT
Start: 2019-08-20 | End: 2019-08-22

## 2019-08-20 RX ORDER — AMMONIA INHALANTS 0.04 G/.3ML
0.3 INHALANT RESPIRATORY (INHALATION) AS NEEDED
Status: DISCONTINUED | OUTPATIENT
Start: 2019-08-20 | End: 2019-08-20 | Stop reason: HOSPADM

## 2019-08-20 RX ORDER — HYDROMORPHONE HYDROCHLORIDE 1 MG/ML
INJECTION, SOLUTION INTRAMUSCULAR; INTRAVENOUS; SUBCUTANEOUS
Status: COMPLETED
Start: 2019-08-20 | End: 2019-08-20

## 2019-08-20 RX ORDER — TRISODIUM CITRATE DIHYDRATE AND CITRIC ACID MONOHYDRATE 500; 334 MG/5ML; MG/5ML
30 SOLUTION ORAL AS NEEDED
Status: DISCONTINUED | OUTPATIENT
Start: 2019-08-20 | End: 2019-08-20 | Stop reason: HOSPADM

## 2019-08-20 RX ORDER — DOCUSATE SODIUM 100 MG/1
100 CAPSULE, LIQUID FILLED ORAL
Status: DISCONTINUED | OUTPATIENT
Start: 2019-08-20 | End: 2019-08-22

## 2019-08-20 RX ORDER — HYDROMORPHONE HYDROCHLORIDE 1 MG/ML
1 INJECTION, SOLUTION INTRAMUSCULAR; INTRAVENOUS; SUBCUTANEOUS ONCE
Status: COMPLETED | OUTPATIENT
Start: 2019-08-20 | End: 2019-08-20

## 2019-08-20 RX ORDER — IBUPROFEN 600 MG/1
600 TABLET ORAL ONCE AS NEEDED
Status: DISCONTINUED | OUTPATIENT
Start: 2019-08-20 | End: 2019-08-20 | Stop reason: HOSPADM

## 2019-08-20 RX ORDER — ZOLPIDEM TARTRATE 5 MG/1
5 TABLET ORAL NIGHTLY PRN
Status: DISCONTINUED | OUTPATIENT
Start: 2019-08-20 | End: 2019-08-22

## 2019-08-20 RX ORDER — DEXTROSE, SODIUM CHLORIDE, SODIUM LACTATE, POTASSIUM CHLORIDE, AND CALCIUM CHLORIDE 5; .6; .31; .03; .02 G/100ML; G/100ML; G/100ML; G/100ML; G/100ML
INJECTION, SOLUTION INTRAVENOUS AS NEEDED
Status: DISCONTINUED | OUTPATIENT
Start: 2019-08-20 | End: 2019-08-20 | Stop reason: HOSPADM

## 2019-08-20 RX ORDER — BISACODYL 10 MG
10 SUPPOSITORY, RECTAL RECTAL ONCE AS NEEDED
Status: ACTIVE | OUTPATIENT
Start: 2019-08-20 | End: 2019-08-20

## 2019-08-20 RX ORDER — IBUPROFEN 600 MG/1
600 TABLET ORAL EVERY 6 HOURS
Status: DISCONTINUED | OUTPATIENT
Start: 2019-08-20 | End: 2019-08-22

## 2019-08-20 NOTE — H&P
[facility] Patient Status:  Inpatient    3/22/1985 MRN [id number]   Location [facility] Attending Kamron Noguera MD   Hosp Day # 0 PCP Corrine Colbert MD     Subjective:  Ganga Ayon the patient. Pre-admission, admission, and post admission procedures and expectations were discussed in detail. All questions answered, all appropriate consents will be signed at the Hospital. Admission is planned for today.    Intervention: IV Pitocin au

## 2019-08-20 NOTE — PLAN OF CARE
Problem: SAFETY ADULT - FALL  Goal: Free from fall injury  Description  INTERVENTIONS:  - Assess pt frequently for physical needs  - Identify cognitive and physical deficits and behaviors that affect risk of falls.   - [place] fall precautions as indica previous experience with breast feeding.  - Provide information as needed about early infant feeding cues (e.g., rooting, lip smacking, sucking fingers/hand) versus late cue of crying.  - Discuss/demonstrate breast feeding aids (e.g., infant sling, nursing services/case management support as needed.   Outcome: Progressing

## 2019-08-20 NOTE — L&D DELIVERY NOTE
Berenice Morse [[id number]]    Labor Events     labor?:  No  Rupture date/time:  2019 0740     Rupture type:  SROM  Fluid color:  Clear     Labor Event Times    Dilation complete date/time:  2019 1328     Lacerations    Episiotomy:  No placed in the dorsolithotomy position and prepped. She was encouraged to push. As the head was delivered the legs were lowered and the perineum was supported to decrease the risk of tearing.   The infants nose and mouth were bulb suctioned and the nuchal

## 2019-08-20 NOTE — PROGRESS NOTES
Pt is a 27year old female admitted to 113/113-A. Patient presents with:  R/o Rom     Pt is  39w4d intra-uterine pregnancy. History obtained, consents signed. Oriented to room, staff, and plan of care.

## 2019-08-20 NOTE — PROGRESS NOTES
Patient up to bathroom with assist x 2. Unable to void at this time. Patient transferred to mother/baby room 2213 per wheelchair in stable condition with personal belongings after stopping at NICU room 201 to visit baby. Accompanied by staff.   Report giv

## 2019-08-21 LAB
BASOPHILS # BLD AUTO: 0.02 X10(3) UL (ref 0–0.2)
BASOPHILS NFR BLD AUTO: 0.2 %
DEPRECATED RDW RBC AUTO: 49.1 FL (ref 35.1–46.3)
EOSINOPHIL # BLD AUTO: 0.04 X10(3) UL (ref 0–0.7)
EOSINOPHIL NFR BLD AUTO: 0.3 %
ERYTHROCYTE [DISTWIDTH] IN BLOOD BY AUTOMATED COUNT: 16.3 % (ref 11–15)
HCT VFR BLD AUTO: 29 % (ref 35–48)
HGB BLD-MCNC: 9.6 G/DL (ref 12–16)
IMM GRANULOCYTES # BLD AUTO: 0.08 X10(3) UL (ref 0–1)
IMM GRANULOCYTES NFR BLD: 0.7 %
LYMPHOCYTES # BLD AUTO: 1.54 X10(3) UL (ref 1–4)
LYMPHOCYTES NFR BLD AUTO: 12.6 %
MCH RBC QN AUTO: 27.7 PG (ref 26–34)
MCHC RBC AUTO-ENTMCNC: 33.1 G/DL (ref 31–37)
MCV RBC AUTO: 83.6 FL (ref 80–100)
MONOCYTES # BLD AUTO: 0.96 X10(3) UL (ref 0.1–1)
MONOCYTES NFR BLD AUTO: 7.9 %
NEUTROPHILS # BLD AUTO: 9.54 X10 (3) UL (ref 1.5–7.7)
NEUTROPHILS # BLD AUTO: 9.54 X10(3) UL (ref 1.5–7.7)
NEUTROPHILS NFR BLD AUTO: 78.3 %
PLATELET # BLD AUTO: 165 10(3)UL (ref 150–450)
RBC # BLD AUTO: 3.47 X10(6)UL (ref 3.8–5.3)
WBC # BLD AUTO: 12.2 X10(3) UL (ref 4–11)

## 2019-08-21 NOTE — PLAN OF CARE
Lungs sounds are clear to auscultation. Breasts soft. Fundus firm at u with small rubra lochia. Pt. Ambulating and voiding without difficulty. Negative homans and negative for clonus. Perineum well approximated  . Pt.denies pian.

## 2019-08-21 NOTE — PLAN OF CARE
Problem: SAFETY ADULT - FALL  Goal: Free from fall injury  Description  INTERVENTIONS:  - Assess pt frequently for physical needs  - Identify cognitive and physical deficits and behaviors that affect risk of falls.   - [place] fall precautions as indica Encourage skin-to-skin contact. - Provide LC support as needed. - Assess for and manage engorgement. - Provide breast feeding education handouts and information on community breast feeding support.    Outcome: Progressing     Problem: POSTPARTUM  Goal: E

## 2019-08-21 NOTE — CM/SW NOTE
MAMADOU met with pt and , [place], to provide support and encouragement due to NICU admission of baby boy to the NICU.     SW reviewed support services for the NICU including Brandon Maciel family room and sleep room areas, NICU facebook page, NICU support

## 2019-08-21 NOTE — PROGRESS NOTES
[facility]  Obstetrics and Gynecology    OB/GYN: Postpartum Progress Note     SUBJECTIVE:  Patient is a 27year old  female who is s/p . She is PPD# 1. Doing well. Denies fever, chills, N, V, chest pain and SOB.  Bleeding has been st

## 2019-08-22 VITALS
BODY MASS INDEX: 35 KG/M2 | HEART RATE: 67 BPM | TEMPERATURE: 99 F | RESPIRATION RATE: 18 BRPM | WEIGHT: 185 LBS | DIASTOLIC BLOOD PRESSURE: 81 MMHG | SYSTOLIC BLOOD PRESSURE: 120 MMHG

## 2019-08-22 PROBLEM — Z34.90 PREGNANCY (HCC): Status: RESOLVED | Noted: 2019-08-20 | Resolved: 2019-08-22

## 2019-08-22 PROBLEM — Z34.90 PREGNANCY: Status: RESOLVED | Noted: 2019-08-20 | Resolved: 2019-08-22

## 2019-08-22 NOTE — PROGRESS NOTES
GYNE postpartum note     S: patient is a 28yo  PPD#2 s/p . Zachery Augusta Bleeding is minimal now.   Denies pain  Isai gen diet  Baby coming out of NICU today    O:    19  0750   BP: 120/81   Pulse: 67   Resp: 18   Temp: 98.6 °F (37 °C)       Gen АНДРЕЙ Valles

## 2019-08-22 NOTE — DISCHARGE SUMMARY
[facility]  Discharge Summary    Lynn Vanegas Patient Status:  inpatient    3/22/1985 MRN [id number]   Location 2213/2213-A Attending EMG [facility] Thai Wu Day # 2 PCP Tete Baez MD     Date of Admission: 2019    Date of Discharge:

## 2019-08-22 NOTE — PROGRESS NOTES
Discharged home w/belongings. Follow-up instructions given to pt who verbalized good understanding. Pt to visit baby in NICU.

## 2019-08-22 NOTE — PLAN OF CARE
Problem: POSTPARTUM  Goal: Long Term Goal:Experiences normal postpartum course  Description  INTERVENTIONS:  - Assess and monitor vital signs and lab values. - Assess fundus and lochia. - Provide ice/sitz baths for perineum discomfort.   - Monitor heali pain/trauma. - Instruct and provide assistance with proper latch. - Review techniques for milk expression (breast pumping) and storage of breast milk. Provide pumping equipment/supplies, instructions and assistance, as needed.   - Encourage rooming-in and engorgement. - Instruct on breast care. - Provide comfort measures.   Outcome: Completed

## 2019-08-27 ENCOUNTER — TELEPHONE (OUTPATIENT)
Dept: OBGYN UNIT | Facility: HOSPITAL | Age: 34
End: 2019-08-27

## 2019-08-28 ENCOUNTER — TELEPHONE (OUTPATIENT)
Dept: OBGYN UNIT | Facility: HOSPITAL | Age: 34
End: 2019-08-28

## 2019-09-03 ENCOUNTER — NURSE ONLY (OUTPATIENT)
Dept: LACTATION | Facility: HOSPITAL | Age: 34
End: 2019-09-03
Attending: OBSTETRICS & GYNECOLOGY
Payer: COMMERCIAL

## 2019-09-03 PROCEDURE — 99213 OFFICE O/P EST LOW 20 MIN: CPT

## 2019-09-13 ENCOUNTER — NURSE ONLY (OUTPATIENT)
Dept: LACTATION | Facility: HOSPITAL | Age: 34
End: 2019-09-13
Attending: OBSTETRICS & GYNECOLOGY
Payer: COMMERCIAL

## 2019-09-13 DIAGNOSIS — O92.5 SUPPRESSED LACTATION, POSTPARTUM CONDITION OR COMPLICATION: Primary | ICD-10-CM

## 2019-09-13 PROCEDURE — 99212 OFFICE O/P EST SF 10 MIN: CPT

## 2019-09-13 NOTE — PROGRESS NOTES
LACTATION NOTE - MOTHER      Evaluation Type: Outpatient Follow Up    Problems identified  Problems identified: Knowledge deficit;Milk supply WNL  Problems Identified Other: Infant s/p NICU    Maternal history  Maternal history: AMA  Other/comment: Yesi

## 2019-09-24 ENCOUNTER — NURSE ONLY (OUTPATIENT)
Dept: LACTATION | Facility: HOSPITAL | Age: 34
End: 2019-09-24
Attending: OBSTETRICS & GYNECOLOGY
Payer: COMMERCIAL

## 2019-09-24 PROCEDURE — 99212 OFFICE O/P EST SF 10 MIN: CPT

## 2019-09-24 NOTE — PROGRESS NOTES
LACTATION NOTE - MOTHER      Evaluation Type: Outpatient Follow Up    Problems identified  Problems identified: Milk supply WNL; Knowledge deficit  Problems Identified Other: Infant s/p NICU    Maternal history  Maternal history: AMA  Other/comment: Yesi

## 2019-10-01 ENCOUNTER — POSTPARTUM (OUTPATIENT)
Dept: OBGYN CLINIC | Facility: CLINIC | Age: 34
End: 2019-10-01
Payer: COMMERCIAL

## 2019-10-01 VITALS
WEIGHT: 159 LBS | DIASTOLIC BLOOD PRESSURE: 76 MMHG | HEIGHT: 61 IN | SYSTOLIC BLOOD PRESSURE: 124 MMHG | BODY MASS INDEX: 30.02 KG/M2

## 2019-10-01 DIAGNOSIS — Z23 NEED FOR VACCINATION: ICD-10-CM

## 2019-10-01 PROCEDURE — 90471 IMMUNIZATION ADMIN: CPT | Performed by: OBSTETRICS & GYNECOLOGY

## 2019-10-01 PROCEDURE — 90686 IIV4 VACC NO PRSV 0.5 ML IM: CPT | Performed by: OBSTETRICS & GYNECOLOGY

## 2019-10-01 NOTE — PROGRESS NOTES
GYNE postpartum note     S: patient is a 28yo  who presents today for post partum visit. She underwent svvd on 08/20/19. She is doing well, still breast feeding . Has no complaints.  Bleeding is minimal now   Denies any depressed mood, anxiety, no SI/HI

## 2019-10-28 ENCOUNTER — OFFICE VISIT (OUTPATIENT)
Dept: SURGERY | Facility: CLINIC | Age: 34
End: 2019-10-28
Payer: COMMERCIAL

## 2019-10-28 VITALS
HEART RATE: 77 BPM | HEIGHT: 61 IN | DIASTOLIC BLOOD PRESSURE: 80 MMHG | TEMPERATURE: 99 F | SYSTOLIC BLOOD PRESSURE: 124 MMHG | WEIGHT: 155 LBS | BODY MASS INDEX: 29.27 KG/M2

## 2019-10-28 DIAGNOSIS — K92.2 INTESTINAL BLEEDING: ICD-10-CM

## 2019-10-28 DIAGNOSIS — K64.4 EXTERNAL HEMORRHOID: Primary | ICD-10-CM

## 2019-10-28 DIAGNOSIS — Z02.82 ADOPTED: ICD-10-CM

## 2019-10-28 DIAGNOSIS — K64.2 PROLAPSED INTERNAL HEMORRHOIDS, GRADE 3: ICD-10-CM

## 2019-10-28 PROBLEM — Z78.9 ADOPTED: Status: ACTIVE | Noted: 2019-10-28

## 2019-10-28 PROCEDURE — 46600 DIAGNOSTIC ANOSCOPY SPX: CPT | Performed by: COLON & RECTAL SURGERY

## 2019-10-28 PROCEDURE — 99243 OFF/OP CNSLTJ NEW/EST LOW 30: CPT | Performed by: COLON & RECTAL SURGERY

## 2019-10-28 RX ORDER — POLYETHYLENE GLYCOL 3350, SODIUM CHLORIDE, SODIUM BICARBONATE, POTASSIUM CHLORIDE 420; 11.2; 5.72; 1.48 G/4L; G/4L; G/4L; G/4L
POWDER, FOR SOLUTION ORAL
Qty: 1 BOTTLE | Refills: 0 | Status: SHIPPED | OUTPATIENT
Start: 2019-10-28 | End: 2019-11-18

## 2019-10-28 NOTE — H&P
New Patient Visit Note       Active Problems      1. External hemorrhoid    2. Prolapsed internal hemorrhoids, grade 3    3. Adopted    4.  Intestinal bleeding        Chief Complaint   Patient presents with:  Hemorrhoids: New patient referred by OB/GYN for plan.  The physician performed all medical decision making. Augutsa Rhodes PA-C    I agree with the note as scribed by the PA  I performed my own physical exam and obtained the history as detailed above.   I have made all appropriate changes in documen Alcohol/week: 4.0 standard drinks        Types: 4 Standard drinks or equivalent per week        Frequency: Never        Comment: 4-5 xwk, 1 glass/wk, never a problem      Drug use: No      Sexual activity: Yes        Partners: Male    Other Topics      Con kg/m²   Physical Exam   Constitutional: She is oriented to person, place, and time. She appears well-developed and well-nourished. She is cooperative. No distress. Eyes: Conjunctivae are normal. No scleral icterus. Neck: Trachea normal. Neck supple.  No moderate rectocele. There are large grade 3 internal hemorrhoids in the left lateral, right posterior lateral, and right anterior lateral positions. There is no evidence of any proctitis or active Crohn's disease. There are no palpable masses.      Lymph the itching worse. The patient states that she has occasional constipation and diarrhea. She had constipation worse when she was pregnant. She had a recent episode of diarrhea this previous weekend.     The patient is adopted and is not aware of any co understand the conversation and its details. Consent for surgery was confirmed with the patient. No orders of the defined types were placed in this encounter. Imaging & Referrals   None    Follow Up  Return in 3 weeks (on 11/18/2019).     Giles Arce

## 2019-10-28 NOTE — PATIENT INSTRUCTIONS
Ruth Luisa presents today in consultation of Dr. Leland Berger for hemorrhoids and bleeding with bowel movements. The patient states that she has had hemorrhoids for approximately 2 years. At that time, however she states she only felt 1.   She gave huong recommended that we perform rubber band treatments of the 3 internal hemorrhoids, followed by an excisional hemorrhoidectomy of her external hemorrhoid with a final rubber band treatment at that time.     I will also have the patient scheduled for a colonos

## 2019-10-28 NOTE — H&P (VIEW-ONLY)
New Patient Visit Note       Active Problems      1. External hemorrhoid    2. Prolapsed internal hemorrhoids, grade 3    3. Adopted    4.  Intestinal bleeding        Chief Complaint   Patient presents with:  Hemorrhoids: New patient referred by OB/GYN for plan.  The physician performed all medical decision making. Massiel Christian PA-C    I agree with the note as scribed by the PA  I performed my own physical exam and obtained the history as detailed above.   I have made all appropriate changes in documen Alcohol/week: 4.0 standard drinks        Types: 4 Standard drinks or equivalent per week        Frequency: Never        Comment: 4-5 xwk, 1 glass/wk, never a problem      Drug use: No      Sexual activity: Yes        Partners: Male    Other Topics      Con kg/m²   Physical Exam   Constitutional: She is oriented to person, place, and time. She appears well-developed and well-nourished. She is cooperative. No distress. Eyes: Conjunctivae are normal. No scleral icterus. Neck: Trachea normal. Neck supple.  No moderate rectocele. There are large grade 3 internal hemorrhoids in the left lateral, right posterior lateral, and right anterior lateral positions. There is no evidence of any proctitis or active Crohn's disease. There are no palpable masses.      Lymph the itching worse. The patient states that she has occasional constipation and diarrhea. She had constipation worse when she was pregnant. She had a recent episode of diarrhea this previous weekend.     The patient is adopted and is not aware of any co understand the conversation and its details. Consent for surgery was confirmed with the patient. No orders of the defined types were placed in this encounter. Imaging & Referrals   None    Follow Up  Return in 3 weeks (on 11/18/2019).     Franc Mccoy

## 2019-10-29 ENCOUNTER — TELEPHONE (OUTPATIENT)
Dept: SURGERY | Facility: CLINIC | Age: 34
End: 2019-10-29

## 2019-10-29 DIAGNOSIS — K92.2 INTESTINAL BLEEDING: Primary | ICD-10-CM

## 2019-10-29 NOTE — PROCEDURES
Procedure:  Anoscopy    Surgeon: Phyllis Reyes    Anesthesia: None    Findings: See the progress note attached for all findings    Operative Summary: The patient was placed in a prone position on the proctoscopy table, the hips were flexed in the jackknife p

## 2019-11-04 ENCOUNTER — TELEPHONE (OUTPATIENT)
Dept: SURGERY | Facility: CLINIC | Age: 34
End: 2019-11-04

## 2019-11-04 NOTE — TELEPHONE ENCOUNTER
Patient called requesting colonoscopy prep instructions be mailed to her. Reviewed pre op surgery checklist and Trilyte bowel prep with patient. Mailed all instructions to patient. Patient voiced understanding.      Patient is scheduled for colonscopy on

## 2019-11-06 ENCOUNTER — TELEPHONE (OUTPATIENT)
Dept: SURGERY | Facility: CLINIC | Age: 34
End: 2019-11-06

## 2019-11-07 ENCOUNTER — TELEPHONE (OUTPATIENT)
Dept: SURGERY | Facility: CLINIC | Age: 34
End: 2019-11-07

## 2019-11-08 ENCOUNTER — OFFICE VISIT (OUTPATIENT)
Dept: FAMILY MEDICINE CLINIC | Facility: CLINIC | Age: 34
End: 2019-11-08
Payer: COMMERCIAL

## 2019-11-08 VITALS
BODY MASS INDEX: 29.27 KG/M2 | TEMPERATURE: 98 F | SYSTOLIC BLOOD PRESSURE: 120 MMHG | HEART RATE: 68 BPM | DIASTOLIC BLOOD PRESSURE: 78 MMHG | RESPIRATION RATE: 16 BRPM | WEIGHT: 155 LBS | HEIGHT: 61 IN

## 2019-11-08 DIAGNOSIS — S66.911A WRIST STRAIN, RIGHT, INITIAL ENCOUNTER: Primary | ICD-10-CM

## 2019-11-08 PROCEDURE — 99213 OFFICE O/P EST LOW 20 MIN: CPT | Performed by: FAMILY MEDICINE

## 2019-11-08 NOTE — PROGRESS NOTES
[facility] Visit Note  11/8/2019      Subjective:      Patient ID: Antonette Abbasi is a 27year old female.     Chief Complaint:  Patient presents with:  Wrist Pain: Right wrist pain started  2 &1/2 wks after talking/holding her cell phone for ab

## 2019-11-13 ENCOUNTER — HOSPITAL ENCOUNTER (OUTPATIENT)
Facility: HOSPITAL | Age: 34
Setting detail: HOSPITAL OUTPATIENT SURGERY
Discharge: HOME | End: 2019-11-13
Attending: COLON & RECTAL SURGERY | Admitting: COLON & RECTAL SURGERY
Payer: COMMERCIAL

## 2019-11-13 VITALS
RESPIRATION RATE: 20 BRPM | SYSTOLIC BLOOD PRESSURE: 108 MMHG | TEMPERATURE: 98 F | DIASTOLIC BLOOD PRESSURE: 77 MMHG | BODY MASS INDEX: 29.27 KG/M2 | HEART RATE: 87 BPM | HEIGHT: 61 IN | OXYGEN SATURATION: 100 % | WEIGHT: 155 LBS

## 2019-11-13 DIAGNOSIS — K92.2 INTESTINAL BLEEDING: ICD-10-CM

## 2019-11-13 PROCEDURE — 0DJD8ZZ INSPECTION OF LOWER INTESTINAL TRACT, VIA NATURAL OR ARTIFICIAL OPENING ENDOSCOPIC: ICD-10-PCS | Performed by: COLON & RECTAL SURGERY

## 2019-11-13 PROCEDURE — 99153 MOD SED SAME PHYS/QHP EA: CPT | Performed by: COLON & RECTAL SURGERY

## 2019-11-13 PROCEDURE — 81025 URINE PREGNANCY TEST: CPT | Performed by: COLON & RECTAL SURGERY

## 2019-11-13 PROCEDURE — 99152 MOD SED SAME PHYS/QHP 5/>YRS: CPT | Performed by: COLON & RECTAL SURGERY

## 2019-11-13 RX ORDER — SODIUM CHLORIDE, SODIUM LACTATE, POTASSIUM CHLORIDE, CALCIUM CHLORIDE 600; 310; 30; 20 MG/100ML; MG/100ML; MG/100ML; MG/100ML
INJECTION, SOLUTION INTRAVENOUS CONTINUOUS
Status: DISCONTINUED | OUTPATIENT
Start: 2019-11-13 | End: 2019-11-13

## 2019-11-13 RX ORDER — MIDAZOLAM HYDROCHLORIDE 1 MG/ML
INJECTION INTRAMUSCULAR; INTRAVENOUS
Status: DISCONTINUED | OUTPATIENT
Start: 2019-11-13 | End: 2019-11-13

## 2019-11-13 NOTE — OPERATIVE REPORT
[facility]  Op Note    Aminata Jose Location: OR   [facility] [id number] MRN [id number]   Admission Date 11/13/2019 Operation Date 11/13/2019   Attending Physician Alanna Murillo MD Operating Physician David Gregory MD     Pre-Operative Diagnosis: Intestin performed. The colonoscope was inserted, and passed to the end of the colon. Upon withdrawal of the scope, the prep was rated as follows and the Loco bowel prep scale:  3    0. Unprepared colon segment with stool but cannot be cleared  1.   Portion o screening population. Current recommendations for screening include colonoscopy every 10 years starting at age 48.  If this patient displays any signs and symptoms consistent with colon cancer, the patient should return to me immediately, even if it is prio

## 2019-11-13 NOTE — INTERVAL H&P NOTE
Pre-op Diagnosis: Intestinal bleeding [K92.2]    The above referenced H&P was reviewed by Aisha Potter MD on 11/13/2019, the patient was examined and no significant changes have occurred in the patient's condition since the H&P was performed.   I discussed

## 2019-11-18 ENCOUNTER — OFFICE VISIT (OUTPATIENT)
Dept: SURGERY | Facility: CLINIC | Age: 34
End: 2019-11-18
Payer: COMMERCIAL

## 2019-11-18 VITALS
WEIGHT: 155 LBS | BODY MASS INDEX: 29.27 KG/M2 | HEIGHT: 61 IN | HEART RATE: 64 BPM | TEMPERATURE: 98 F | DIASTOLIC BLOOD PRESSURE: 70 MMHG | SYSTOLIC BLOOD PRESSURE: 102 MMHG

## 2019-11-18 DIAGNOSIS — K64.2 PROLAPSED INTERNAL HEMORRHOIDS, GRADE 3: Primary | ICD-10-CM

## 2019-11-18 PROCEDURE — 46221 LIGATION OF HEMORRHOID(S): CPT | Performed by: COLON & RECTAL SURGERY

## 2019-11-19 NOTE — PROCEDURES
Pre op diagnosis: Internal Hemorrhoids    Post op diagnosis: Same    Procedure: Anoscopy with O-ring Rubber Band Ligation of Internal Hemorrhoids    Surgeon: Malgorzata Murphy MD    History of present illness:    This patient has internal hemorrhoids that are s

## 2019-11-19 NOTE — PROGRESS NOTES
Follow Up Visit Note       Active Problems      1.  Prolapsed internal hemorrhoids, grade 3          Chief Complaint   Patient presents with:  Hemorrhoid Banding: est patient 1st hemorrhoid banding, no change from last visit,        History of Present Illne Sexual activity: Yes        Partners: Male    Other Topics      Concerns:        Caffeine Concern: Yes          1 cup of coffee x day        Exercise: Yes          cardio/pilates        Seat Belt: Yes       Current Outpatient Medications:   •  Phenyleph-S tolerated the procedure well. The patient remained stable throughout the entire procedure within my office. The patient was asked to recover in my office for a few minutes prior to leaving for home.     The patient should refrain from extreme sports or

## 2019-11-19 NOTE — PATIENT INSTRUCTIONS
At today's office visit we treated right sowmya-lateral internal hemorrhoid. At today's visit, the patient underwent an uncomplicated application of a rubber band and injection of a 5% phenol solution into the base of the rubberbanded hemorrhoid.     The

## 2019-11-20 ENCOUNTER — TELEPHONE (OUTPATIENT)
Dept: SURGERY | Facility: CLINIC | Age: 34
End: 2019-11-20

## 2019-11-20 DIAGNOSIS — K64.2 GRADE III HEMORRHOIDS: Primary | ICD-10-CM

## 2019-12-12 ENCOUNTER — OFFICE VISIT (OUTPATIENT)
Dept: SURGERY | Facility: CLINIC | Age: 34
End: 2019-12-12
Payer: COMMERCIAL

## 2019-12-12 VITALS
SYSTOLIC BLOOD PRESSURE: 108 MMHG | WEIGHT: 155 LBS | HEIGHT: 61 IN | DIASTOLIC BLOOD PRESSURE: 76 MMHG | TEMPERATURE: 99 F | BODY MASS INDEX: 29.27 KG/M2 | HEART RATE: 73 BPM

## 2019-12-12 DIAGNOSIS — K64.2 PROLAPSED INTERNAL HEMORRHOIDS, GRADE 3: Primary | ICD-10-CM

## 2019-12-12 PROCEDURE — 46221 LIGATION OF HEMORRHOID(S): CPT | Performed by: COLON & RECTAL SURGERY

## 2019-12-12 NOTE — PROGRESS NOTES
Follow Up Visit Note       Active Problems      1. Prolapsed internal hemorrhoids, grade 3          Chief Complaint   Patient presents with:  Hemorrhoid Banding: Second hemohoid banding.  Pt states the first banding went well, took 2 ibuprofen and \"was fin 2 per month      Drug use: No      Sexual activity: Yes        Partners: Male    Other Topics      Concerns:        Caffeine Concern: Yes          1 cup of coffee x day        Exercise: Yes          cardio/pilates        Seat Belt: Yes       Current Outpat of the rubberbanded hemorrhoid. The patient tolerated the procedure well. The patient remained stable throughout the entire procedure within my office. The patient was asked to recover in my office for a few minutes prior to leaving for home.     The

## 2019-12-26 ENCOUNTER — TELEPHONE (OUTPATIENT)
Dept: FAMILY MEDICINE CLINIC | Facility: CLINIC | Age: 34
End: 2019-12-26

## 2019-12-26 NOTE — TELEPHONE ENCOUNTER
Spoke to pt, c/o intermittent RLQ x 2 days  No constipation or diarrhea  No nausea/vomiting  No fever  Rates pain 3/10  Scheduled pt with Dr. Jackson Links   Date Time Provider Tee Camachoi   12/27/2019  7:45 AM Delmy Chavez

## 2019-12-26 NOTE — TELEPHONE ENCOUNTER
Patient states she is having severe abdominal pain, wanted an appt today or tomorrow, please advise.

## 2019-12-27 ENCOUNTER — HOSPITAL ENCOUNTER (EMERGENCY)
Age: 34
Discharge: HOME | End: 2019-12-27
Attending: EMERGENCY MEDICINE
Payer: COMMERCIAL

## 2019-12-27 ENCOUNTER — APPOINTMENT (OUTPATIENT)
Dept: ULTRASOUND IMAGING | Age: 34
End: 2019-12-27
Attending: EMERGENCY MEDICINE
Payer: COMMERCIAL

## 2019-12-27 ENCOUNTER — OFFICE VISIT (OUTPATIENT)
Dept: FAMILY MEDICINE CLINIC | Facility: CLINIC | Age: 34
End: 2019-12-27
Payer: COMMERCIAL

## 2019-12-27 VITALS
RESPIRATION RATE: 16 BRPM | SYSTOLIC BLOOD PRESSURE: 106 MMHG | OXYGEN SATURATION: 99 % | WEIGHT: 145 LBS | HEIGHT: 62 IN | DIASTOLIC BLOOD PRESSURE: 69 MMHG | BODY MASS INDEX: 26.68 KG/M2 | HEART RATE: 58 BPM | TEMPERATURE: 99 F

## 2019-12-27 VITALS
SYSTOLIC BLOOD PRESSURE: 115 MMHG | RESPIRATION RATE: 16 BRPM | HEART RATE: 69 BPM | HEIGHT: 61 IN | TEMPERATURE: 97 F | OXYGEN SATURATION: 93 % | WEIGHT: 145.63 LBS | DIASTOLIC BLOOD PRESSURE: 70 MMHG | BODY MASS INDEX: 27.5 KG/M2

## 2019-12-27 DIAGNOSIS — R10.2 PELVIC PAIN: Primary | ICD-10-CM

## 2019-12-27 DIAGNOSIS — R10.31 RLQ ABDOMINAL PAIN: Primary | ICD-10-CM

## 2019-12-27 LAB
ALBUMIN SERPL-MCNC: 3.4 G/DL (ref 3.4–5)
ALBUMIN/GLOB SERPL: 0.7 {RATIO} (ref 1–2)
ALP LIVER SERPL-CCNC: 128 U/L (ref 37–98)
ALT SERPL-CCNC: 20 U/L (ref 13–56)
ANION GAP SERPL CALC-SCNC: 7 MMOL/L (ref 0–18)
AST SERPL-CCNC: 12 U/L (ref 15–37)
BASOPHILS # BLD AUTO: 0.02 X10(3) UL (ref 0–0.2)
BASOPHILS NFR BLD AUTO: 0.2 %
BILIRUB SERPL-MCNC: 0.5 MG/DL (ref 0.1–2)
BILIRUB UR QL STRIP.AUTO: NEGATIVE
BUN BLD-MCNC: 10 MG/DL (ref 7–18)
BUN/CREAT SERPL: 14.1 (ref 10–20)
CALCIUM BLD-MCNC: 8.5 MG/DL (ref 8.5–10.1)
CHLORIDE SERPL-SCNC: 105 MMOL/L (ref 98–112)
CLARITY UR REFRACT.AUTO: CLEAR
CO2 SERPL-SCNC: 26 MMOL/L (ref 21–32)
COLOR UR AUTO: YELLOW
CREAT BLD-MCNC: 0.71 MG/DL (ref 0.55–1.02)
DEPRECATED RDW RBC AUTO: 40.8 FL (ref 35.1–46.3)
EOSINOPHIL # BLD AUTO: 0.07 X10(3) UL (ref 0–0.7)
EOSINOPHIL NFR BLD AUTO: 0.8 %
ERYTHROCYTE [DISTWIDTH] IN BLOOD BY AUTOMATED COUNT: 13.1 % (ref 11–15)
GLOBULIN PLAS-MCNC: 4.7 G/DL (ref 2.8–4.4)
GLUCOSE BLD-MCNC: 100 MG/DL (ref 70–99)
GLUCOSE UR STRIP.AUTO-MCNC: NEGATIVE MG/DL
HCG SERPL QL: NEGATIVE
HCT VFR BLD AUTO: 36.5 % (ref 35–48)
HGB BLD-MCNC: 12 G/DL (ref 12–16)
IMM GRANULOCYTES # BLD AUTO: 0.03 X10(3) UL (ref 0–1)
IMM GRANULOCYTES NFR BLD: 0.3 %
KETONES UR STRIP.AUTO-MCNC: NEGATIVE MG/DL
LEUKOCYTE ESTERASE UR QL STRIP.AUTO: NEGATIVE
LIPASE SERPL-CCNC: 81 U/L (ref 73–393)
LYMPHOCYTES # BLD AUTO: 1.91 X10(3) UL (ref 1–4)
LYMPHOCYTES NFR BLD AUTO: 20.6 %
M PROTEIN MFR SERPL ELPH: 8.1 G/DL (ref 6.4–8.2)
MCH RBC QN AUTO: 28.4 PG (ref 26–34)
MCHC RBC AUTO-ENTMCNC: 32.9 G/DL (ref 31–37)
MCV RBC AUTO: 86.5 FL (ref 80–100)
MONOCYTES # BLD AUTO: 0.67 X10(3) UL (ref 0.1–1)
MONOCYTES NFR BLD AUTO: 7.2 %
NEUTROPHILS # BLD AUTO: 6.58 X10 (3) UL (ref 1.5–7.7)
NEUTROPHILS # BLD AUTO: 6.58 X10(3) UL (ref 1.5–7.7)
NEUTROPHILS NFR BLD AUTO: 70.9 %
NITRITE UR QL STRIP.AUTO: NEGATIVE
OSMOLALITY SERPL CALC.SUM OF ELEC: 285 MOSM/KG (ref 275–295)
PH UR STRIP.AUTO: 7 [PH] (ref 4.5–8)
PLATELET # BLD AUTO: 228 10(3)UL (ref 150–450)
POTASSIUM SERPL-SCNC: 3.6 MMOL/L (ref 3.5–5.1)
PROT UR STRIP.AUTO-MCNC: NEGATIVE MG/DL
RBC # BLD AUTO: 4.22 X10(6)UL (ref 3.8–5.3)
RBC UR QL AUTO: NEGATIVE
SODIUM SERPL-SCNC: 138 MMOL/L (ref 136–145)
SP GR UR STRIP.AUTO: 1.01 (ref 1–1.03)
UROBILINOGEN UR STRIP.AUTO-MCNC: 0.2 MG/DL
WBC # BLD AUTO: 9.3 X10(3) UL (ref 4–11)

## 2019-12-27 PROCEDURE — 76856 US EXAM PELVIC COMPLETE: CPT | Performed by: EMERGENCY MEDICINE

## 2019-12-27 PROCEDURE — 99284 EMERGENCY DEPT VISIT MOD MDM: CPT

## 2019-12-27 PROCEDURE — 99213 OFFICE O/P EST LOW 20 MIN: CPT | Performed by: FAMILY MEDICINE

## 2019-12-27 PROCEDURE — 93975 VASCULAR STUDY: CPT | Performed by: EMERGENCY MEDICINE

## 2019-12-27 PROCEDURE — 80053 COMPREHEN METABOLIC PANEL: CPT | Performed by: EMERGENCY MEDICINE

## 2019-12-27 PROCEDURE — 83690 ASSAY OF LIPASE: CPT | Performed by: EMERGENCY MEDICINE

## 2019-12-27 PROCEDURE — 76830 TRANSVAGINAL US NON-OB: CPT | Performed by: EMERGENCY MEDICINE

## 2019-12-27 PROCEDURE — 36415 COLL VENOUS BLD VENIPUNCTURE: CPT

## 2019-12-27 PROCEDURE — 81003 URINALYSIS AUTO W/O SCOPE: CPT | Performed by: EMERGENCY MEDICINE

## 2019-12-27 PROCEDURE — 85025 COMPLETE CBC W/AUTO DIFF WBC: CPT | Performed by: EMERGENCY MEDICINE

## 2019-12-27 PROCEDURE — 84703 CHORIONIC GONADOTROPIN ASSAY: CPT | Performed by: EMERGENCY MEDICINE

## 2019-12-27 NOTE — PATIENT INSTRUCTIONS
Please head to the ER. Abdominal Pain    Abdominal pain is pain in the stomach or belly area. Everyone has this pain from time to time. In many cases it goes away on its own.  But abdominal pain can sometimes be due to a serious problem, such as append use, including herbs, vitamins, and supplements. Treating abdominal pain  Some causes of pain need emergency medical treatment right away. These include appendicitis or a bowel blockage. Other problems can be treated with rest, fluids, or medicines.  Your before you go to bed or lie down. · Raise the head of your bed. Date Last Reviewed: 7/1/2016  © 9697-6977 [employer]. [address] [facility] [place], [address] [place] [address]. All rights reserved.  This information is not intended as a substitute for prof

## 2019-12-27 NOTE — ED PROVIDER NOTES
Patient Seen in: THE [facility] Emergency Department In [place]      History   Patient presents with:  Abdomen/Flank Pain    Stated Complaint: rlq abd pain    HPI    16-year-old female presents emergency room with chief complaint of intermittent right lower a Drug use: No             Review of Systems    Positive for stated complaint: rlq abd pain  Other systems are as noted in HPI. Constitutional and vital signs reviewed. All other systems reviewed and negative except as noted above.     Physical Exam ---------                               -----------         ------                     CBC W/ DIFFERENTIAL[[id number]]                              Final result                 Please view results for these tests on the individual orders.    URINALYSIS WIT AM

## 2019-12-27 NOTE — PROGRESS NOTES
HPI:    Patient ID: Jacy Grissom is a 27year old female. Patient presents with:  Pain: RLQ pain for the past 3 days. Pain goes on and off. She thought it was gas pain. Has been having regular bowel movements.  She stated it feels like contractions jimenez papilloma virus infection    • Migraine 2017   • Visual impairment       Past Surgical History:   Procedure Laterality Date   • COLONOSCOPY N/A 11/13/2019    Performed by Logan Quevedo MD at [facility] ENDOSCOPY   • COLPOSCOPY, CERVIX W/UPPER ADJACENT VAGINA; W/B She exhibits no distension and no mass. There is no hepatosplenomegaly. There is tenderness. There is tenderness at McBurney's point. There is no rigidity, no rebound, no guarding, no CVA tenderness and negative Reece's sign.  Musculoskeletal: Normal range

## 2019-12-28 ENCOUNTER — OFFICE VISIT (OUTPATIENT)
Dept: FAMILY MEDICINE CLINIC | Facility: CLINIC | Age: 34
End: 2019-12-28
Payer: COMMERCIAL

## 2019-12-28 VITALS
RESPIRATION RATE: 16 BRPM | WEIGHT: 148.5 LBS | SYSTOLIC BLOOD PRESSURE: 110 MMHG | HEIGHT: 62 IN | TEMPERATURE: 97 F | HEART RATE: 64 BPM | BODY MASS INDEX: 27.33 KG/M2 | OXYGEN SATURATION: 99 % | DIASTOLIC BLOOD PRESSURE: 80 MMHG

## 2019-12-28 DIAGNOSIS — R10.33 PERIUMBILICAL ABDOMINAL PAIN: Primary | ICD-10-CM

## 2019-12-28 PROCEDURE — 99213 OFFICE O/P EST LOW 20 MIN: CPT | Performed by: FAMILY MEDICINE

## 2019-12-28 NOTE — PATIENT INSTRUCTIONS
Abdominal Pain    Abdominal pain is pain in the stomach or belly area. Everyone has this pain from time to time. In many cases it goes away on its own. But abdominal pain can sometimes be due to a serious problem, such as appendicitis.  So it’s important Also report any family history of stomach or intestinal problems, or cancers. Tell your provider about all your alcohol use and drug use. Tell your provider about all medicines you use, including herbs, vitamins, and supplements.    Treating abdominal pain · Don't use aspirin or over-the-counter pain and fever medicines, if possible. This includes nonsteroidal anti-inflammatory drugs (NSAIDs). · Lose excess weight. · Finish eating at least 2 hours before you go to bed or lie down.   · Raise the head of your

## 2019-12-28 NOTE — PROGRESS NOTES
Patient presents with:  Abdominal Pain: Follow up. HPI:  Patient is here for follow up for abdominal pain that has beenongoing for 4 days now. Says taht pain is cramping, sharp occurs 4-5 times per day located in the RLQ.    She was sent to the ED yest • Migraine 2017   • Visual impairment      Past Surgical History:   Procedure Laterality Date   • COLONOSCOPY N/A 11/13/2019    Performed by Dalton Joyce MD at [facility] ENDOSCOPY   • COLPOSCOPY, CERVIX W/UPPER ADJACENT VAGINA; W/BIOPSY(S), CERVIX  2004   • CRY Constitutional: Oriented to person, place, and time. No distress. HEENT:  Normocephalic and atraumatic. Nose normal. Oropharynx is clear and moist.   Eyes: Conjunctivae normal PERRLA. No scleral icterus. Neck: Normal range of motion.    Cardiovascular: · Severe acid reflux, called GERD (gastroesophageal reflux disease)  · A sore in the lining of the stomach or small intestine (peptic ulcer)  · Inflammation of the gallbladder, liver, or pancreas  · Gallstones or kidney stones  · Appendicitis   · Intestina If you have vomiting or diarrhea, sip water or other clear fluids. When you are ready to eat solid foods again, start with small amounts of easy-to-digest, low-fat foods. These include apple sauce, toast, or crackers.   When to get medical care  Call 911 or All questions were answered and the patient understands the plan.

## 2020-01-02 ENCOUNTER — TELEPHONE (OUTPATIENT)
Dept: OBGYN CLINIC | Facility: CLINIC | Age: 35
End: 2020-01-02

## 2020-01-02 ENCOUNTER — OFFICE VISIT (OUTPATIENT)
Dept: SURGERY | Facility: CLINIC | Age: 35
End: 2020-01-02
Payer: COMMERCIAL

## 2020-01-02 VITALS
WEIGHT: 148 LBS | HEART RATE: 69 BPM | SYSTOLIC BLOOD PRESSURE: 97 MMHG | BODY MASS INDEX: 27.23 KG/M2 | DIASTOLIC BLOOD PRESSURE: 68 MMHG | RESPIRATION RATE: 16 BRPM | HEIGHT: 62 IN

## 2020-01-02 DIAGNOSIS — K64.2 PROLAPSED INTERNAL HEMORRHOIDS, GRADE 3: Primary | ICD-10-CM

## 2020-01-02 DIAGNOSIS — K64.4 EXTERNAL HEMORRHOID: ICD-10-CM

## 2020-01-02 DIAGNOSIS — K64.4 EXTERNAL HEMORRHOIDS: Primary | ICD-10-CM

## 2020-01-02 PROCEDURE — 46221 LIGATION OF HEMORRHOID(S): CPT | Performed by: COLON & RECTAL SURGERY

## 2020-01-02 PROCEDURE — 99212 OFFICE O/P EST SF 10 MIN: CPT | Performed by: COLON & RECTAL SURGERY

## 2020-01-02 NOTE — TELEPHONE ENCOUNTER
Patient called stating that she has been having left sided clog x 1 day. She noticed some redness but also had been hand massaging all day yesterday. She also has tried warm shower, warm compress, sunflower lecithin with no relief.  She denies any chills, f

## 2020-01-02 NOTE — PATIENT INSTRUCTIONS
I am seeing this patient for ongoing treatment of her hemorrhoids. She has several large internal hemorrhoids that have been rubberbanded. She presents for further rubber band treatment and evaluation. She continues with symptoms of prolapse.   There h for surgery was confirmed with the patient.

## 2020-01-02 NOTE — PROGRESS NOTES
Follow Up Visit Note       Active Problems      1. Prolapsed internal hemorrhoids, grade 3    2. External hemorrhoid          Chief Complaint   Patient presents with:  Hemorrhoid Banding: EST PT 3rd banding. PT denies any new issues or concerns.          Hi does have health issues but they are unknown   • Seizure Disorder Brother         intellectual issues   • Other (Neuropathy) Mother      Social History    Socioeconomic History      Marital status:       Spouse name: Not on file      Number of ch Negative for arthralgias and myalgias. Skin: Negative for color change and rash. Neurological: Negative for tremors, syncope and weakness. Hematological: Negative for adenopathy. Does not bruise/bleed easily.    Psychiatric/Behavioral: Negative for be her to have remaining internal hemorrhoids. At today's office visit we treated a right posterior lateral internal hemorrhoid.   She remains with the large external prolapsing hemorrhoid in the region of the posterior midline just to the right of the midlin

## 2020-01-02 NOTE — PROCEDURES
Pre op diagnosis: Internal Hemorrhoids    Post op diagnosis: Same    Procedure: Anoscopy with O-ring Rubber Band Ligation of Internal Hemorrhoids    Surgeon: Sayra Hess MD    History of present illness:    This patient has internal hemorrhoids that are s

## 2020-03-12 ENCOUNTER — OFFICE VISIT (OUTPATIENT)
Dept: OBGYN CLINIC | Facility: CLINIC | Age: 35
End: 2020-03-12
Payer: COMMERCIAL

## 2020-03-12 VITALS
DIASTOLIC BLOOD PRESSURE: 70 MMHG | SYSTOLIC BLOOD PRESSURE: 108 MMHG | HEIGHT: 62 IN | WEIGHT: 141 LBS | BODY MASS INDEX: 25.95 KG/M2

## 2020-03-12 DIAGNOSIS — Z12.4 SCREENING FOR MALIGNANT NEOPLASM OF CERVIX: Primary | ICD-10-CM

## 2020-03-12 PROCEDURE — 99395 PREV VISIT EST AGE 18-39: CPT | Performed by: OBSTETRICS & GYNECOLOGY

## 2020-03-12 PROCEDURE — 88175 CYTOPATH C/V AUTO FLUID REDO: CPT | Performed by: OBSTETRICS & GYNECOLOGY

## 2020-03-12 PROCEDURE — 87624 HPV HI-RISK TYP POOLED RSLT: CPT | Performed by: OBSTETRICS & GYNECOLOGY

## 2020-03-12 NOTE — PROGRESS NOTES
GYN H&P     3/12/2020  3:01 PM    CC: Patient is here for annual exam, clogged nipple duct    HPI: patient is a 27year old  here for her annual gyn exam.  Ayesha Gilman is doing well! Now 7 months old. Her  has had a vasectomy.  He has not gone for th Prenatal Vit-Fe Fumarate-FA ( PRENATAL VITAMINS) 28-0.8 MG Oral Tab, Take 1 tablet by mouth daily. , Disp: , Rfl:     No current facility-administered medications on file prior to visit.      Family History   Adopted: Yes   Problem Relation Age of Onset No breast swelling, tenderness, discharge or bleeding. No labial fusion. There is no rash, tenderness, lesion or injury on the right labia. There is no rash, tenderness, lesion or injury on the left labia.  Uterus is not deviated, not enlarged, not fixed an 3. ? Clogged duct: may have been clogged but more c/w sebaceous cyst. Recommend she go home, feed baby, take warm shower and clean with soap afterwards, and standard precautions.  Reviewed infection si/sx today    Autumn Oleary MD

## 2020-03-13 LAB — HPV I/H RISK 1 DNA SPEC QL NAA+PROBE: NEGATIVE

## 2020-03-25 ENCOUNTER — TELEPHONE (OUTPATIENT)
Dept: SURGERY | Facility: CLINIC | Age: 35
End: 2020-03-25

## 2020-03-25 DIAGNOSIS — K64.9 HEMORRHOIDS, UNSPECIFIED HEMORRHOID TYPE: Primary | ICD-10-CM

## 2020-03-25 NOTE — TELEPHONE ENCOUNTER
I called that patient to reschedule her pre-op appointment with dr Joaquin Almeida on 4/13/2020, surgery is scheduled on 5/1/2020. Per patient, cancel office and surgery, she will call back to reschedule.

## 2020-08-24 ENCOUNTER — PATIENT MESSAGE (OUTPATIENT)
Dept: FAMILY MEDICINE CLINIC | Facility: CLINIC | Age: 35
End: 2020-08-24

## 2020-09-14 NOTE — TELEPHONE ENCOUNTER
From: Morelia BARTH  To: Angel Kee  Sent: 8/24/2020 8:13 AM CDT  Subject: Flu shot not available until around beginning of October. Carina Harris,    I just want to let you know we do not have the Flu shot available yet.  We should have it available delores

## 2020-09-16 ENCOUNTER — OFFICE VISIT (OUTPATIENT)
Dept: FAMILY MEDICINE CLINIC | Facility: CLINIC | Age: 35
End: 2020-09-16
Payer: COMMERCIAL

## 2020-09-16 VITALS
BODY MASS INDEX: 24.29 KG/M2 | WEIGHT: 132 LBS | DIASTOLIC BLOOD PRESSURE: 60 MMHG | HEART RATE: 64 BPM | TEMPERATURE: 98 F | HEIGHT: 62 IN | RESPIRATION RATE: 16 BRPM | SYSTOLIC BLOOD PRESSURE: 100 MMHG | OXYGEN SATURATION: 97 %

## 2020-09-16 DIAGNOSIS — Z00.00 ROUTINE MEDICAL EXAM: Primary | ICD-10-CM

## 2020-09-16 DIAGNOSIS — Z23 NEED FOR VACCINATION: ICD-10-CM

## 2020-09-16 PROBLEM — H18.539 GRANULAR CORNEAL DYSTROPHY: Status: ACTIVE | Noted: 2020-06-15

## 2020-09-16 PROCEDURE — 3074F SYST BP LT 130 MM HG: CPT | Performed by: FAMILY MEDICINE

## 2020-09-16 PROCEDURE — 90686 IIV4 VACC NO PRSV 0.5 ML IM: CPT | Performed by: FAMILY MEDICINE

## 2020-09-16 PROCEDURE — 99395 PREV VISIT EST AGE 18-39: CPT | Performed by: FAMILY MEDICINE

## 2020-09-16 PROCEDURE — 90471 IMMUNIZATION ADMIN: CPT | Performed by: FAMILY MEDICINE

## 2020-09-16 PROCEDURE — 3008F BODY MASS INDEX DOCD: CPT | Performed by: FAMILY MEDICINE

## 2020-09-16 PROCEDURE — 3078F DIAST BP <80 MM HG: CPT | Performed by: FAMILY MEDICINE

## 2020-09-16 RX ORDER — ALPRAZOLAM 0.25 MG/1
0.25 TABLET ORAL DAILY PRN
Qty: 15 TABLET | Refills: 0 | Status: SHIPPED | OUTPATIENT
Start: 2020-09-16

## 2020-09-16 NOTE — PROGRESS NOTES
Patient presents with:  Physical  Imm/Inj: Influenza vaccine today      HPI:  Garrett Bolanos is a 38year old female here today for preventative visit.         Imms- up to date with Tdap, and open to flu shot today.        Cervical cancer screening- her current facility-administered medications on file prior to visit.        Penicillins             HIVES  Social History    Socioeconomic History      Marital status:       Spouse name: Not on file      Number of children: Not on file      Years of edu coffee x day        Occupational Exposure: Not Asked        Hobby Hazards: Not Asked        Sleep Concern: Not Asked        Stress Concern: Not Asked        Weight Concern: Not Asked        Special Diet: Not Asked        Back Care: Not Asked        Exercis Need for vaccination  - IMMUNIZATION ADMINISTRATION  - FLULAVAL INFLUENZA VACCINE QUAD PRESERVATIVE FREE 0.5 ML        Patient verbalized understanding and agrees to plan.       Return in about 1 year (around 9/16/2021) for annual physical, sooner as needed

## 2021-04-28 ENCOUNTER — OFFICE VISIT (OUTPATIENT)
Dept: OBGYN CLINIC | Facility: CLINIC | Age: 36
End: 2021-04-28
Payer: COMMERCIAL

## 2021-04-28 VITALS
WEIGHT: 131.81 LBS | DIASTOLIC BLOOD PRESSURE: 70 MMHG | HEART RATE: 60 BPM | SYSTOLIC BLOOD PRESSURE: 102 MMHG | HEIGHT: 61.5 IN | BODY MASS INDEX: 24.57 KG/M2

## 2021-04-28 DIAGNOSIS — Z12.4 CERVICAL CANCER SCREENING: ICD-10-CM

## 2021-04-28 DIAGNOSIS — Z01.419 WELL WOMAN EXAM WITH ROUTINE GYNECOLOGICAL EXAM: Primary | ICD-10-CM

## 2021-04-28 PROCEDURE — 87624 HPV HI-RISK TYP POOLED RSLT: CPT | Performed by: NURSE PRACTITIONER

## 2021-04-28 PROCEDURE — 88175 CYTOPATH C/V AUTO FLUID REDO: CPT | Performed by: NURSE PRACTITIONER

## 2021-04-28 PROCEDURE — 3008F BODY MASS INDEX DOCD: CPT | Performed by: NURSE PRACTITIONER

## 2021-04-28 PROCEDURE — 3078F DIAST BP <80 MM HG: CPT | Performed by: NURSE PRACTITIONER

## 2021-04-28 PROCEDURE — 3074F SYST BP LT 130 MM HG: CPT | Performed by: NURSE PRACTITIONER

## 2021-04-28 PROCEDURE — 99395 PREV VISIT EST AGE 18-39: CPT | Performed by: NURSE PRACTITIONER

## 2021-04-28 RX ORDER — CETIRIZINE HYDROCHLORIDE 1 MG/ML
5 SOLUTION ORAL DAILY
COMMUNITY

## 2021-04-28 RX ORDER — ECHINACEA PURPUREA EXTRACT 125 MG
1 TABLET ORAL AS NEEDED
COMMUNITY

## 2021-04-28 NOTE — PROGRESS NOTES
Here for Routine Annual Exam  No concerns. Still nursing. Menses - regular. Contraception - had vasectomy, but has not had follow up semen analysis. No C/O      ROS: No Cardiac, Respiratory, GI,  or Neurological symptoms.     PE:  GENERAL:

## 2021-09-17 ENCOUNTER — PATIENT MESSAGE (OUTPATIENT)
Dept: FAMILY MEDICINE CLINIC | Facility: CLINIC | Age: 36
End: 2021-09-17

## 2021-09-18 ENCOUNTER — OFFICE VISIT (OUTPATIENT)
Dept: FAMILY MEDICINE CLINIC | Facility: CLINIC | Age: 36
End: 2021-09-18
Payer: COMMERCIAL

## 2021-09-18 VITALS
TEMPERATURE: 98 F | RESPIRATION RATE: 18 BRPM | WEIGHT: 128 LBS | HEART RATE: 80 BPM | SYSTOLIC BLOOD PRESSURE: 112 MMHG | DIASTOLIC BLOOD PRESSURE: 68 MMHG | BODY MASS INDEX: 24 KG/M2

## 2021-09-18 DIAGNOSIS — L50.9 URTICARIA: Primary | ICD-10-CM

## 2021-09-18 PROCEDURE — 3078F DIAST BP <80 MM HG: CPT | Performed by: PHYSICIAN ASSISTANT

## 2021-09-18 PROCEDURE — 99213 OFFICE O/P EST LOW 20 MIN: CPT | Performed by: PHYSICIAN ASSISTANT

## 2021-09-18 PROCEDURE — 3074F SYST BP LT 130 MM HG: CPT | Performed by: PHYSICIAN ASSISTANT

## 2021-09-18 RX ORDER — METHYLPREDNISOLONE 4 MG/1
TABLET ORAL
Qty: 30 TABLET | Refills: 0 | Status: SHIPPED | OUTPATIENT
Start: 2021-09-18

## 2021-09-18 NOTE — PROGRESS NOTES
Hives, THurs AM, tx zyrtec, HIves Fri, No relief with antihistamine benadryl. Claritin. Ingris Spar Spread into new area, generalized. Denies sOB/PERSON, no CP, no     CHIEF COMPLAINT:   No chief complaint on file.          HPI:    Marie Draper is a 39 year History   Adopted: Yes   Problem Relation Age of Onset   • Other (Other) Father         does have health issues but they are unknown   • Seizure Disorder Brother         intellectual issues   • Other (Neuropathy) Mother       Social History    Tobacco Use Instructions. Skin care discussed with patient. Meds & Refills for this Visit:  Requested Prescriptions     Signed Prescriptions Disp Refills   • methylPREDNISolone (MEDROL) 4 MG Oral Tablet Therapy Pack 30 tablet 0     Sig: As directed.        Risk an urticaria). If you have hives  · Stay away from the food, drink, medicine, or other thing that may be causing the hives. · Ask your healthcare provider how to control itchy or irritated skin.   · Talk with your provider right away if you think a medicine instructions.

## 2021-09-18 NOTE — PATIENT INSTRUCTIONS
1.  Medrol dose pack (steroid). Tapering dose, instructions on packaging. 2.  Continue antihistamines over the counter. 3.  May add Pepcid 20 mg twice daily.    4.  Consider hydroxyzine for relief of hives and anxiety (discuss with your primary care p breathing or swallowing, and a hoarse voice. This reaction may happen right away. Or it may happen in an hour or more. In extreme cases, the airways from mouth to lungs may swell and make breathing difficult. This is a medical emergency.  Use epinephrine me

## 2021-09-22 ENCOUNTER — OFFICE VISIT (OUTPATIENT)
Dept: FAMILY MEDICINE CLINIC | Facility: CLINIC | Age: 36
End: 2021-09-22
Payer: COMMERCIAL

## 2021-09-22 VITALS
RESPIRATION RATE: 16 BRPM | DIASTOLIC BLOOD PRESSURE: 60 MMHG | BODY MASS INDEX: 24.23 KG/M2 | HEART RATE: 65 BPM | OXYGEN SATURATION: 99 % | WEIGHT: 130 LBS | HEIGHT: 61.5 IN | TEMPERATURE: 98 F | SYSTOLIC BLOOD PRESSURE: 100 MMHG

## 2021-09-22 DIAGNOSIS — Z00.00 ROUTINE MEDICAL EXAM: Primary | ICD-10-CM

## 2021-09-22 DIAGNOSIS — Z00.00 LABORATORY EXAM ORDERED AS PART OF ROUTINE GENERAL MEDICAL EXAMINATION: ICD-10-CM

## 2021-09-22 DIAGNOSIS — Z12.39 ENCOUNTER FOR SCREENING FOR MALIGNANT NEOPLASM OF BREAST, UNSPECIFIED SCREENING MODALITY: ICD-10-CM

## 2021-09-22 DIAGNOSIS — F41.9 ANXIETY: ICD-10-CM

## 2021-09-22 DIAGNOSIS — Z23 NEED FOR VACCINATION: ICD-10-CM

## 2021-09-22 PROBLEM — Z34.93 ENCOUNTER FOR SUPERVISION OF NORMAL PREGNANCY IN THIRD TRIMESTER (HCC): Status: RESOLVED | Noted: 2019-06-19 | Resolved: 2021-09-22

## 2021-09-22 PROBLEM — Z34.93 ENCOUNTER FOR SUPERVISION OF NORMAL PREGNANCY IN THIRD TRIMESTER: Status: RESOLVED | Noted: 2019-06-19 | Resolved: 2021-09-22

## 2021-09-22 PROCEDURE — 99395 PREV VISIT EST AGE 18-39: CPT | Performed by: FAMILY MEDICINE

## 2021-09-22 PROCEDURE — 90686 IIV4 VACC NO PRSV 0.5 ML IM: CPT | Performed by: FAMILY MEDICINE

## 2021-09-22 PROCEDURE — 3078F DIAST BP <80 MM HG: CPT | Performed by: FAMILY MEDICINE

## 2021-09-22 PROCEDURE — 90471 IMMUNIZATION ADMIN: CPT | Performed by: FAMILY MEDICINE

## 2021-09-22 PROCEDURE — 3074F SYST BP LT 130 MM HG: CPT | Performed by: FAMILY MEDICINE

## 2021-09-22 PROCEDURE — 3008F BODY MASS INDEX DOCD: CPT | Performed by: FAMILY MEDICINE

## 2021-09-22 RX ORDER — DIPHENHYDRAMINE HCL 25 MG
1 TABLET ORAL DAILY
COMMUNITY
Start: 2021-09-16

## 2021-09-22 NOTE — PROGRESS NOTES
Patient presents with:  Physical  Immunization/Injection: Influenza vaccine today      HPI:  Jt Hummel is a 39year old female here today for preventative visit.     Imms- up to date with covid, Tdap, and open to flu shot today.        Cervical cance Laterality Date   • COLONOSCOPY N/A 2019    Procedure: COLONOSCOPY;  Surgeon: Jose Mariscal MD;  Location: [address] [place] [address] ENDOSCOPY   • COLPOSCOPY, CERVIX W/UPPER ADJACENT VAGINA; W/BIOPSY(S), CERVIX     • CRYOCAUTERY OF CERVIX      freezing   •   0 Yes          1 cup of coffee x day        Occupational Exposure: Not Asked        Hobby Hazards: Not Asked        Sleep Concern: Not Asked        Stress Concern: Not Asked        Weight Concern: Not Asked        Special Diet: Not Asked        Back Care: No Review of Systems - All systems reviewed and negative except for HPI    PHYSICAL EXAM:  /60   Pulse 65   Temp 97.7 °F (36.5 °C) (Temporal)   Resp 16   Ht 5' 1.5\" (1.562 m)   Wt 130 lb (59 kg)   LMP 09/05/2021 (Exact Date)   SpO2 99%   BMI 24. 1

## 2021-09-29 ENCOUNTER — LAB ENCOUNTER (OUTPATIENT)
Dept: LAB | Age: 36
End: 2021-09-29
Attending: FAMILY MEDICINE
Payer: COMMERCIAL

## 2021-09-29 DIAGNOSIS — F41.9 ANXIETY: ICD-10-CM

## 2021-09-29 DIAGNOSIS — Z00.00 LABORATORY EXAM ORDERED AS PART OF ROUTINE GENERAL MEDICAL EXAMINATION: ICD-10-CM

## 2021-09-29 PROCEDURE — 80053 COMPREHEN METABOLIC PANEL: CPT

## 2021-09-29 PROCEDURE — 84443 ASSAY THYROID STIM HORMONE: CPT

## 2021-09-29 PROCEDURE — 36415 COLL VENOUS BLD VENIPUNCTURE: CPT

## 2021-09-29 PROCEDURE — 85025 COMPLETE CBC W/AUTO DIFF WBC: CPT

## 2021-09-29 PROCEDURE — 80061 LIPID PANEL: CPT

## 2022-03-21 ENCOUNTER — OFFICE VISIT (OUTPATIENT)
Dept: SURGERY | Facility: CLINIC | Age: 37
End: 2022-03-21
Payer: COMMERCIAL

## 2022-03-21 VITALS
BODY MASS INDEX: 23.86 KG/M2 | WEIGHT: 128 LBS | DIASTOLIC BLOOD PRESSURE: 82 MMHG | SYSTOLIC BLOOD PRESSURE: 118 MMHG | HEIGHT: 61.5 IN | HEART RATE: 64 BPM | TEMPERATURE: 98 F

## 2022-03-21 DIAGNOSIS — K64.2 GRADE III INTERNAL HEMORRHOIDS: Primary | ICD-10-CM

## 2022-03-21 DIAGNOSIS — K64.4 EXTERNAL PROLAPSED HEMORRHOIDS: ICD-10-CM

## 2022-03-21 PROCEDURE — 3074F SYST BP LT 130 MM HG: CPT | Performed by: COLON & RECTAL SURGERY

## 2022-03-21 PROCEDURE — 3008F BODY MASS INDEX DOCD: CPT | Performed by: COLON & RECTAL SURGERY

## 2022-03-21 PROCEDURE — 46600 DIAGNOSTIC ANOSCOPY SPX: CPT | Performed by: COLON & RECTAL SURGERY

## 2022-03-21 PROCEDURE — 3079F DIAST BP 80-89 MM HG: CPT | Performed by: COLON & RECTAL SURGERY

## 2022-03-21 PROCEDURE — 99214 OFFICE O/P EST MOD 30 MIN: CPT | Performed by: COLON & RECTAL SURGERY

## 2022-03-22 PROBLEM — K64.2 GRADE III INTERNAL HEMORRHOIDS: Status: ACTIVE | Noted: 2019-10-28

## 2022-03-22 PROBLEM — K64.4 EXTERNAL PROLAPSED HEMORRHOIDS: Status: ACTIVE | Noted: 2019-10-28

## 2022-03-22 NOTE — PATIENT INSTRUCTIONS
This patient has undergone previous rubber band treatments in 2019, and completed a colonoscopy on 11/13/2019, for symptoms of prolapse and intestinal bleeding from a combination of internal hemorrhoids grade 3, and external prolapsed hemorrhoids. Unfortunately her symptoms are advancing. She had pain that was 5/10 2 weeks ago. She has lumps that prolapse that require manual reduction by her on a regular basis. She does not feel any obstruction of defecation. She does have constipation. During a pregnancy, she had the most symptoms from her hemorrhoids. She has no current bright red blood per rectum or melena. She has no mucus in the stool or narrowing of the stool. She has no abdominal pain or distention. She has not suffered weight loss as a symptom. She is adopted and has no specific family history that is available to her. Her colonoscopy was normal in 2019, with the exception of a diverticular pocket in the cecum. She has never been diagnosed with Crohn disease, ulcerative colitis, or irritable bowel syndrome. She has had 1 vaginal delivery, no episiotomy. She has never had prolapse of the rectum itself, or any pelvic floor prolapse. She has never had a cardiac history of any kind including heart murmur, heart valve replacement, or cardiac arrhythmia. Clinical exam including anoscopy reveals her to have a right anterolateral and a left posterior lateral mixed complex internal and external hemorrhoid. The internal components are slightly greater than the external components. There is no evidence of proctitis or Crohn's disease. There is no evidence of fissure or fistula. Rectovaginal septum is smooth with minimal rectocele. There is no evidence of a thrombosis event. This patient will require excisional hemorrhoidectomy in 2 locations.     I described the procedure, the severe postop pain that will happen for at least 3 to 5 days, and her postoperative care and treatment. All risks, benefits, complications and alternatives to the proposed procedure(s) were fully discussed with the patient. All questions from the patient were answered in detail. A description of the procedure(s) and possible outcomes was fully discussed. The patient seemed to understand the conversation and its details. Consent for the procedure(s) was confirmed with the patient. We are currently scheduling her for surgery at [facility] on April 22, 2022.

## 2022-03-30 NOTE — TELEPHONE ENCOUNTER
Requesting Alprazolam 0.25mg  LOV: 9/22/21 Physical  RTC: 1 year  Last Relevant Labs: 9/29/21  Filled: 9/16/2020 #30 with 0 refills    Future Appointments   Date Time Provider Tee Meneses   5/4/2022  8:00 AM [facility] [address]     Rx pended and routed for approval/denial

## 2022-03-31 RX ORDER — ALPRAZOLAM 0.25 MG/1
0.25 TABLET ORAL DAILY PRN
Qty: 15 TABLET | Refills: 0 | Status: SHIPPED | OUTPATIENT
Start: 2022-03-31

## 2022-04-19 ENCOUNTER — LAB ENCOUNTER (OUTPATIENT)
Dept: LAB | Age: 37
End: 2022-04-19
Payer: COMMERCIAL

## 2022-04-19 DIAGNOSIS — Z01.818 PRE-OP TESTING: ICD-10-CM

## 2022-04-19 DIAGNOSIS — K64.2 GRADE III INTERNAL HEMORRHOIDS: ICD-10-CM

## 2022-04-20 LAB — SARS-COV-2 RNA RESP QL NAA+PROBE: NOT DETECTED

## 2022-04-22 ENCOUNTER — ANESTHESIA EVENT (OUTPATIENT)
Dept: SURGERY | Facility: HOSPITAL | Age: 37
End: 2022-04-22
Payer: COMMERCIAL

## 2022-04-22 ENCOUNTER — ANESTHESIA (OUTPATIENT)
Dept: SURGERY | Facility: HOSPITAL | Age: 37
End: 2022-04-22
Payer: COMMERCIAL

## 2022-04-22 ENCOUNTER — HOSPITAL ENCOUNTER (OUTPATIENT)
Facility: HOSPITAL | Age: 37
Setting detail: HOSPITAL OUTPATIENT SURGERY
Discharge: HOME | End: 2022-04-22
Attending: COLON & RECTAL SURGERY | Admitting: COLON & RECTAL SURGERY
Payer: COMMERCIAL

## 2022-04-22 VITALS
HEIGHT: 61.5 IN | TEMPERATURE: 98 F | DIASTOLIC BLOOD PRESSURE: 80 MMHG | BODY MASS INDEX: 23.86 KG/M2 | HEART RATE: 87 BPM | OXYGEN SATURATION: 97 % | WEIGHT: 128 LBS | SYSTOLIC BLOOD PRESSURE: 122 MMHG | RESPIRATION RATE: 15 BRPM

## 2022-04-22 DIAGNOSIS — K64.4 EXTERNAL PROLAPSED HEMORRHOIDS: ICD-10-CM

## 2022-04-22 DIAGNOSIS — Z01.818 PRE-OP TESTING: ICD-10-CM

## 2022-04-22 DIAGNOSIS — K64.2 GRADE III INTERNAL HEMORRHOIDS: Primary | ICD-10-CM

## 2022-04-22 LAB — B-HCG UR QL: NEGATIVE

## 2022-04-22 PROCEDURE — 0DJD8ZZ INSPECTION OF LOWER INTESTINAL TRACT, VIA NATURAL OR ARTIFICIAL OPENING ENDOSCOPIC: ICD-10-PCS | Performed by: COLON & RECTAL SURGERY

## 2022-04-22 PROCEDURE — 06BY0ZC EXCISION OF HEMORRHOIDAL PLEXUS, OPEN APPROACH: ICD-10-PCS | Performed by: COLON & RECTAL SURGERY

## 2022-04-22 PROCEDURE — 81025 URINE PREGNANCY TEST: CPT

## 2022-04-22 RX ORDER — ONDANSETRON 2 MG/ML
4 INJECTION INTRAMUSCULAR; INTRAVENOUS AS NEEDED
Status: DISCONTINUED | OUTPATIENT
Start: 2022-04-22 | End: 2022-04-22

## 2022-04-22 RX ORDER — HEPARIN SODIUM 5000 [USP'U]/ML
INJECTION, SOLUTION INTRAVENOUS; SUBCUTANEOUS
Status: DISCONTINUED
Start: 2022-04-22 | End: 2022-04-22

## 2022-04-22 RX ORDER — SODIUM CHLORIDE, SODIUM LACTATE, POTASSIUM CHLORIDE, CALCIUM CHLORIDE 600; 310; 30; 20 MG/100ML; MG/100ML; MG/100ML; MG/100ML
INJECTION, SOLUTION INTRAVENOUS CONTINUOUS
Status: DISCONTINUED | OUTPATIENT
Start: 2022-04-22 | End: 2022-04-22

## 2022-04-22 RX ORDER — HEPARIN SODIUM 5000 [USP'U]/ML
5000 INJECTION, SOLUTION INTRAVENOUS; SUBCUTANEOUS ONCE
Status: COMPLETED | OUTPATIENT
Start: 2022-04-22 | End: 2022-04-22

## 2022-04-22 RX ORDER — ONDANSETRON 2 MG/ML
INJECTION INTRAMUSCULAR; INTRAVENOUS AS NEEDED
Status: DISCONTINUED | OUTPATIENT
Start: 2022-04-22 | End: 2022-04-22 | Stop reason: SURG

## 2022-04-22 RX ORDER — CLINDAMYCIN PHOSPHATE 900 MG/50ML
INJECTION INTRAVENOUS
Status: DISCONTINUED
Start: 2022-04-22 | End: 2022-04-22

## 2022-04-22 RX ORDER — LIDOCAINE HYDROCHLORIDE 10 MG/ML
INJECTION, SOLUTION EPIDURAL; INFILTRATION; INTRACAUDAL; PERINEURAL AS NEEDED
Status: DISCONTINUED | OUTPATIENT
Start: 2022-04-22 | End: 2022-04-22 | Stop reason: SURG

## 2022-04-22 RX ORDER — ONDANSETRON 2 MG/ML
4 INJECTION INTRAMUSCULAR; INTRAVENOUS ONCE
Status: COMPLETED | OUTPATIENT
Start: 2022-04-22 | End: 2022-04-22

## 2022-04-22 RX ORDER — CEFOXITIN 2 G/1
INJECTION, POWDER, FOR SOLUTION INTRAVENOUS AS NEEDED
Status: DISCONTINUED | OUTPATIENT
Start: 2022-04-22 | End: 2022-04-22 | Stop reason: SURG

## 2022-04-22 RX ORDER — HYDROCODONE BITARTRATE AND ACETAMINOPHEN 5; 325 MG/1; MG/1
1 TABLET ORAL AS NEEDED
Status: DISCONTINUED | OUTPATIENT
Start: 2022-04-22 | End: 2022-04-22

## 2022-04-22 RX ORDER — SCOLOPAMINE TRANSDERMAL SYSTEM 1 MG/1
PATCH, EXTENDED RELEASE TRANSDERMAL AS NEEDED
Status: DISCONTINUED | OUTPATIENT
Start: 2022-04-22 | End: 2022-04-22 | Stop reason: SURG

## 2022-04-22 RX ORDER — ROCURONIUM BROMIDE 10 MG/ML
INJECTION, SOLUTION INTRAVENOUS AS NEEDED
Status: DISCONTINUED | OUTPATIENT
Start: 2022-04-22 | End: 2022-04-22 | Stop reason: SURG

## 2022-04-22 RX ORDER — ONDANSETRON 2 MG/ML
4 INJECTION INTRAMUSCULAR; INTRAVENOUS EVERY 6 HOURS PRN
Status: DISCONTINUED | OUTPATIENT
Start: 2022-04-22 | End: 2022-04-22

## 2022-04-22 RX ORDER — HYDROMORPHONE HYDROCHLORIDE 1 MG/ML
0.4 INJECTION, SOLUTION INTRAMUSCULAR; INTRAVENOUS; SUBCUTANEOUS EVERY 5 MIN PRN
Status: DISCONTINUED | OUTPATIENT
Start: 2022-04-22 | End: 2022-04-22

## 2022-04-22 RX ORDER — METOCLOPRAMIDE HYDROCHLORIDE 5 MG/ML
10 INJECTION INTRAMUSCULAR; INTRAVENOUS AS NEEDED
Status: DISCONTINUED | OUTPATIENT
Start: 2022-04-22 | End: 2022-04-22

## 2022-04-22 RX ORDER — HYDROCODONE BITARTRATE AND ACETAMINOPHEN 5; 325 MG/1; MG/1
1 TABLET ORAL EVERY 6 HOURS PRN
Qty: 30 TABLET | Refills: 0 | Status: SHIPPED | OUTPATIENT
Start: 2022-04-22

## 2022-04-22 RX ORDER — DEXAMETHASONE SODIUM PHOSPHATE 4 MG/ML
VIAL (ML) INJECTION AS NEEDED
Status: DISCONTINUED | OUTPATIENT
Start: 2022-04-22 | End: 2022-04-22 | Stop reason: SURG

## 2022-04-22 RX ORDER — HYDROCODONE BITARTRATE AND ACETAMINOPHEN 5; 325 MG/1; MG/1
2 TABLET ORAL AS NEEDED
Status: DISCONTINUED | OUTPATIENT
Start: 2022-04-22 | End: 2022-04-22

## 2022-04-22 RX ORDER — NALOXONE HYDROCHLORIDE 0.4 MG/ML
80 INJECTION, SOLUTION INTRAMUSCULAR; INTRAVENOUS; SUBCUTANEOUS AS NEEDED
Status: DISCONTINUED | OUTPATIENT
Start: 2022-04-22 | End: 2022-04-22

## 2022-04-22 RX ORDER — ACETAMINOPHEN 500 MG
1000 TABLET ORAL ONCE
Status: DISCONTINUED | OUTPATIENT
Start: 2022-04-22 | End: 2022-04-22 | Stop reason: HOSPADM

## 2022-04-22 RX ORDER — CLINDAMYCIN PHOSPHATE 900 MG/50ML
900 INJECTION INTRAVENOUS ONCE
Status: DISCONTINUED | OUTPATIENT
Start: 2022-04-22 | End: 2022-04-22 | Stop reason: HOSPADM

## 2022-04-22 RX ORDER — BUPIVACAINE HYDROCHLORIDE AND EPINEPHRINE 5; 5 MG/ML; UG/ML
INJECTION, SOLUTION EPIDURAL; INTRACAUDAL; PERINEURAL AS NEEDED
Status: DISCONTINUED | OUTPATIENT
Start: 2022-04-22 | End: 2022-04-22 | Stop reason: HOSPADM

## 2022-04-22 RX ORDER — KETOROLAC TROMETHAMINE 30 MG/ML
INJECTION, SOLUTION INTRAMUSCULAR; INTRAVENOUS AS NEEDED
Status: DISCONTINUED | OUTPATIENT
Start: 2022-04-22 | End: 2022-04-22 | Stop reason: SURG

## 2022-04-22 RX ORDER — MIDAZOLAM HYDROCHLORIDE 1 MG/ML
INJECTION INTRAMUSCULAR; INTRAVENOUS AS NEEDED
Status: DISCONTINUED | OUTPATIENT
Start: 2022-04-22 | End: 2022-04-22 | Stop reason: SURG

## 2022-04-22 RX ADMIN — KETOROLAC TROMETHAMINE 30 MG: 30 INJECTION, SOLUTION INTRAMUSCULAR; INTRAVENOUS at 17:28:00

## 2022-04-22 RX ADMIN — LIDOCAINE HYDROCHLORIDE 50 MG: 10 INJECTION, SOLUTION EPIDURAL; INFILTRATION; INTRACAUDAL; PERINEURAL at 16:47:00

## 2022-04-22 RX ADMIN — SODIUM CHLORIDE, SODIUM LACTATE, POTASSIUM CHLORIDE, CALCIUM CHLORIDE: 600; 310; 30; 20 INJECTION, SOLUTION INTRAVENOUS at 16:45:00

## 2022-04-22 RX ADMIN — MIDAZOLAM HYDROCHLORIDE 2 MG: 1 INJECTION INTRAMUSCULAR; INTRAVENOUS at 16:45:00

## 2022-04-22 RX ADMIN — ONDANSETRON 4 MG: 2 INJECTION INTRAMUSCULAR; INTRAVENOUS at 17:28:00

## 2022-04-22 RX ADMIN — DEXAMETHASONE SODIUM PHOSPHATE 8 MG: 4 MG/ML VIAL (ML) INJECTION at 16:55:00

## 2022-04-22 RX ADMIN — ROCURONIUM BROMIDE 5 MG: 10 INJECTION, SOLUTION INTRAVENOUS at 16:45:00

## 2022-04-22 RX ADMIN — CEFOXITIN 2 G: 2 INJECTION, POWDER, FOR SOLUTION INTRAVENOUS at 17:00:00

## 2022-04-22 RX ADMIN — SCOLOPAMINE TRANSDERMAL SYSTEM 1 PATCH: 1 PATCH, EXTENDED RELEASE TRANSDERMAL at 16:45:00

## 2022-04-22 NOTE — ANESTHESIA PROCEDURE NOTES
Airway  Date/Time: 4/22/2022 4:48 PM  Urgency: elective    Airway not difficult    General Information and Staff    Patient location during procedure: OR  Anesthesiologist: Love Toro MD  Performed: anesthesiologist     Indications and Patient Condition  Indications for airway management: anesthesia  Spontaneous Ventilation: absent  Sedation level: deep  Preoxygenated: yes  Patient position: sniffing  Mask difficulty assessment: 1 - vent by mask    Final Airway Details  Final airway type: endotracheal airway      Successful airway: ETT  Cuffed: yes   Successful intubation technique: direct laryngoscopy  Facilitating devices/methods: intubating stylet  Endotracheal tube insertion site: oral  Blade: Alejandra  Blade size: #3  ETT size (mm): 7.0    Cormack-Lehane Classification: grade I - full view of glottis  Placement verified by: chest auscultation and capnometry   Cuff volume (mL): 4  Measured from: lips  ETT to lips (cm): 20  Number of attempts at approach: 1  Number of other approaches attempted: 0

## 2022-04-22 NOTE — OPERATIVE REPORT
[facility]  Op Note    Mavis Rosales Location: [facility] [id number] MRN [id number]   Admission Date 4/22/2022 Operation Date 4/22/2022   Attending Physician Lázaro Roland MD Operating Physician Abhilash Villalta MD     Pre-Operative Diagnosis: Grade III internal hemorrhoids [K64.2]  External prolapsed hemorrhoids [K64.4]    Post-Operative Diagnosis: Same as above    Procedure Performed: Excisional hemorrhoidectomy in 2 location(s)    Surgeon(s) and Role:     Galina Villalobos MD - Primary    Anesthesia: General    History of Present Illness:  Mixed complex prolapsing hemorrhoids in the right anterior and left posterior lateral positions. Operative Findings: This patient was found to have mixed complex prolapsing hemorrhoids  The hemorrhoid(s) were excised without complication  The patient was delivered to recovery room in stable condition        Operative Summary    Following adequate general anesthesia the patient was placed in a prone position on the operating room table. The hips were flexed in the jackknife position, the buttocks were taped apart. The perineum was painted with Betadine paint. Sterile drapes were placed with wide exposure of the perineum. Anoscopy was performed to reveal the above listed findings. Each hemorrhoid was treated in an identical fashion. This patient had 2 hemorrhoid(s). 0.5% Marcaine with epinephrine was injected into all hemorrhoidal complexes, the perianal skin, and into the deep sphincter complex. A 2-0 Vicryl suture was placed at the apex of the hemorrhoidal complex. A V-shaped incision was made in the perianal skin. The hemorrhoidal tissues and hemorrhoidal complexes were dissected off of the sphincter complex was sharp scissors dissection. Great care was taken to avoid the sphincter mechanism. The resultant incision was closed with a running 2-0 Vicryl suture line in a simple continuous fashion.  Great care was taken to reapproximate the anoderm to mucosal junction and the anoderm to cutaneous junction. At the anoderm to cutaneous junction we switched to 3-0 Vicryl. Reinforcing sutures of 2-0 Vicryl were used where necessary to provide further hemostasis and to strengthen the suture line. Once all hemorrhoids were treated in this fashion, all sponge counts and instrument counts were found to be correct, and the patient was returned to the supine position and taken to recovery room in stable postoperative condition.           Pathologic Specimen: hemorrhoid tissue    Drains: none        EBL: 5 cc               Ayaan Arana MD  4/22/2022  5:40 PM

## 2022-04-22 NOTE — ANESTHESIA POSTPROCEDURE EVALUATION
[address] Patient Status:  Hospital Outpatient Surgery   Age/Gender 44year old female MRN [id number]   [facility] SURGERY Attending Mariah Simpson MD   Hosp Day # 0 PCP Juanis Lo MD       Anesthesia Post-op Note    EXCISIONAL HEMORRHOIDECTOMY  RIGHT ANTERIOR, LEFT POSTEROLATERAL    Procedure Summary     Date: 04/22/22 Room / Location: [address] [place] [address] OR 11 / [address] [facility] OR    Anesthesia Start: 3413 Anesthesia Stop: 5259    Procedure: EXCISIONAL HEMORRHOIDECTOMY  RIGHT ANTERIOR, LEFT POSTEROLATERAL (N/A Anus) Diagnosis:       Grade III internal hemorrhoids      External prolapsed hemorrhoids      (Grade III internal hemorrhoids [K64. 2]External prolapsed hemorrhoids [K64.4])    Surgeons: Mariah Simpson MD Anesthesiologist: West Dominguez MD    Anesthesia Type: general ASA Status: 2          Anesthesia Type: general    Vitals Value Taken Time   /70 04/22/22 1753   Temp 97.2 04/22/22 1753   Pulse 97 04/22/22 1753   Resp 18 04/22/22 1753   SpO2 98 04/22/22 1753       Patient Location: PACU    Anesthesia Type: general    Airway Patency: patent    Postop Pain Control: adequate    Mental Status: preanesthetic baseline    Nausea/Vomiting: none    Cardiopulmonary/Hydration status: stable euvolemic    Complications: no apparent anesthesia related complications    Postop vital signs: stable    Dental Exam: Unchanged from Preop    Patient to be discharged from PACU when criteria met.

## 2022-05-04 ENCOUNTER — OFFICE VISIT (OUTPATIENT)
Dept: OBGYN CLINIC | Facility: CLINIC | Age: 37
End: 2022-05-04
Payer: COMMERCIAL

## 2022-05-04 VITALS
BODY MASS INDEX: 24.36 KG/M2 | HEIGHT: 62 IN | DIASTOLIC BLOOD PRESSURE: 64 MMHG | SYSTOLIC BLOOD PRESSURE: 100 MMHG | WEIGHT: 132.38 LBS

## 2022-05-04 DIAGNOSIS — Z12.31 ENCOUNTER FOR SCREENING MAMMOGRAM FOR BREAST CANCER: ICD-10-CM

## 2022-05-04 DIAGNOSIS — Z12.4 CERVICAL CANCER SCREENING: ICD-10-CM

## 2022-05-04 DIAGNOSIS — Z01.419 WELL WOMAN EXAM WITH ROUTINE GYNECOLOGICAL EXAM: Primary | ICD-10-CM

## 2022-05-04 PROCEDURE — 87624 HPV HI-RISK TYP POOLED RSLT: CPT | Performed by: NURSE PRACTITIONER

## 2022-05-04 PROCEDURE — 88175 CYTOPATH C/V AUTO FLUID REDO: CPT | Performed by: NURSE PRACTITIONER

## 2022-05-04 PROCEDURE — 3008F BODY MASS INDEX DOCD: CPT | Performed by: NURSE PRACTITIONER

## 2022-05-04 PROCEDURE — 3078F DIAST BP <80 MM HG: CPT | Performed by: NURSE PRACTITIONER

## 2022-05-04 PROCEDURE — 99395 PREV VISIT EST AGE 18-39: CPT | Performed by: NURSE PRACTITIONER

## 2022-05-04 PROCEDURE — 3074F SYST BP LT 130 MM HG: CPT | Performed by: NURSE PRACTITIONER

## 2022-05-09 LAB — HPV I/H RISK 1 DNA SPEC QL NAA+PROBE: NEGATIVE

## 2022-05-16 ENCOUNTER — OFFICE VISIT (OUTPATIENT)
Dept: SURGERY | Facility: CLINIC | Age: 37
End: 2022-05-16

## 2022-05-16 VITALS
HEART RATE: 61 BPM | TEMPERATURE: 98 F | BODY MASS INDEX: 24.29 KG/M2 | SYSTOLIC BLOOD PRESSURE: 104 MMHG | DIASTOLIC BLOOD PRESSURE: 73 MMHG | WEIGHT: 132 LBS | HEIGHT: 62 IN

## 2022-05-16 DIAGNOSIS — K92.2 INTESTINAL BLEEDING: ICD-10-CM

## 2022-05-16 DIAGNOSIS — K64.2 GRADE III INTERNAL HEMORRHOIDS: ICD-10-CM

## 2022-05-16 DIAGNOSIS — K64.4 EXTERNAL PROLAPSED HEMORRHOIDS: Primary | ICD-10-CM

## 2022-05-16 NOTE — PATIENT INSTRUCTIONS
I am seeing this patient for further care and treatment regarding her excisional hemorrhoidectomy performed on April 22, 2022. This patient is doing extremely well. The patient has no nausea, vomiting, diarrhea, abdominal pain, or constipation. The patient has no complaints at the operative site. The patient has no incisional complaints. The patient denies fever or chills. Clinical exam limited to external exam and digital rectal exam reveals 3 sutures to be dangling. I snipped all of them back to skin level. The operative sites are 95% healed. There are no external hemorrhoids or tags. There are no thrombosis events. There are no fissures or fistulae. There is no evidence of abscess or complication at the incision sites. I have no further follow-up scheduled with this patient at this time. This patient can see me on an as-needed basis. This patient should return urgently for any problems or complications related to my surgical intervention.

## 2022-07-11 ENCOUNTER — OFFICE VISIT (OUTPATIENT)
Dept: FAMILY MEDICINE CLINIC | Facility: CLINIC | Age: 37
End: 2022-07-11
Payer: COMMERCIAL

## 2022-07-11 VITALS
WEIGHT: 131 LBS | SYSTOLIC BLOOD PRESSURE: 100 MMHG | RESPIRATION RATE: 16 BRPM | HEIGHT: 62 IN | OXYGEN SATURATION: 98 % | TEMPERATURE: 98 F | BODY MASS INDEX: 24.11 KG/M2 | DIASTOLIC BLOOD PRESSURE: 60 MMHG | HEART RATE: 63 BPM

## 2022-07-11 DIAGNOSIS — L98.9 BENIGN SKIN LESION OF NECK: ICD-10-CM

## 2022-07-11 DIAGNOSIS — D22.9 NEVUS: Primary | ICD-10-CM

## 2022-07-11 PROCEDURE — 3078F DIAST BP <80 MM HG: CPT | Performed by: FAMILY MEDICINE

## 2022-07-11 PROCEDURE — 3008F BODY MASS INDEX DOCD: CPT | Performed by: FAMILY MEDICINE

## 2022-07-11 PROCEDURE — 99213 OFFICE O/P EST LOW 20 MIN: CPT | Performed by: FAMILY MEDICINE

## 2022-07-11 PROCEDURE — 3074F SYST BP LT 130 MM HG: CPT | Performed by: FAMILY MEDICINE

## 2022-07-11 RX ORDER — CETIRIZINE HYDROCHLORIDE 10 MG/1
10 TABLET ORAL DAILY
COMMUNITY

## 2022-10-11 ENCOUNTER — OFFICE VISIT (OUTPATIENT)
Dept: FAMILY MEDICINE CLINIC | Facility: CLINIC | Age: 37
End: 2022-10-11
Payer: COMMERCIAL

## 2022-10-11 VITALS
HEIGHT: 62 IN | BODY MASS INDEX: 24.11 KG/M2 | RESPIRATION RATE: 16 BRPM | TEMPERATURE: 97 F | SYSTOLIC BLOOD PRESSURE: 110 MMHG | WEIGHT: 131 LBS | OXYGEN SATURATION: 99 % | HEART RATE: 78 BPM | DIASTOLIC BLOOD PRESSURE: 65 MMHG

## 2022-10-11 DIAGNOSIS — Z00.00 ROUTINE MEDICAL EXAM: Primary | ICD-10-CM

## 2022-10-11 DIAGNOSIS — F51.04 PSYCHOPHYSIOLOGICAL INSOMNIA: ICD-10-CM

## 2022-10-11 DIAGNOSIS — Z00.00 LABORATORY EXAM ORDERED AS PART OF ROUTINE GENERAL MEDICAL EXAMINATION: ICD-10-CM

## 2022-10-11 PROBLEM — K92.2 INTESTINAL BLEEDING: Status: RESOLVED | Noted: 2019-10-28 | Resolved: 2022-10-11

## 2022-10-11 PROBLEM — K64.4 EXTERNAL PROLAPSED HEMORRHOIDS: Status: RESOLVED | Noted: 2019-10-28 | Resolved: 2022-10-11

## 2022-10-11 PROBLEM — K64.2 GRADE III INTERNAL HEMORRHOIDS: Status: RESOLVED | Noted: 2019-10-28 | Resolved: 2022-10-11

## 2022-10-11 PROCEDURE — 3074F SYST BP LT 130 MM HG: CPT | Performed by: FAMILY MEDICINE

## 2022-10-11 PROCEDURE — 3078F DIAST BP <80 MM HG: CPT | Performed by: FAMILY MEDICINE

## 2022-10-11 PROCEDURE — 3008F BODY MASS INDEX DOCD: CPT | Performed by: FAMILY MEDICINE

## 2022-10-11 PROCEDURE — 99395 PREV VISIT EST AGE 18-39: CPT | Performed by: FAMILY MEDICINE

## 2022-10-11 RX ORDER — ALPRAZOLAM 0.25 MG/1
0.25 TABLET ORAL DAILY PRN
Qty: 15 TABLET | Refills: 0 | Status: SHIPPED | OUTPATIENT
Start: 2022-10-11

## 2022-11-30 LAB
ABSOLUTE BASOPHILS: 33 CELLS/UL (ref 0–200)
ABSOLUTE EOSINOPHILS: 208 CELLS/UL (ref 15–500)
ABSOLUTE LYMPHOCYTES: 1963 CELLS/UL (ref 850–3900)
ABSOLUTE MONOCYTES: 332 CELLS/UL (ref 200–950)
ABSOLUTE NEUTROPHILS: 3965 CELLS/UL (ref 1500–7800)
ALBUMIN/GLOBULIN RATIO: 1.4 (CALC) (ref 1–2.5)
ALBUMIN: 4.2 G/DL (ref 3.6–5.1)
ALKALINE PHOSPHATASE: 51 U/L (ref 31–125)
ALT: 8 U/L (ref 6–29)
AST: 11 U/L (ref 10–30)
BASOPHILS: 0.5 %
BILIRUBIN, TOTAL: 0.3 MG/DL (ref 0.2–1.2)
BUN: 12 MG/DL (ref 7–25)
CALCIUM: 9.1 MG/DL (ref 8.6–10.2)
CARBON DIOXIDE: 28 MMOL/L (ref 20–32)
CHLORIDE: 102 MMOL/L (ref 98–110)
CHOL/HDLC RATIO: 3.3 (CALC)
CHOLESTEROL, TOTAL: 179 MG/DL
CREATININE: 0.61 MG/DL (ref 0.5–0.97)
EGFR: 117 ML/MIN/1.73M2
EOSINOPHILS: 3.2 %
GLOBULIN: 3 G/DL (CALC) (ref 1.9–3.7)
GLUCOSE: 113 MG/DL (ref 65–139)
HDL CHOLESTEROL: 55 MG/DL
HEMATOCRIT: 37 % (ref 35–45)
HEMOGLOBIN: 12.5 G/DL (ref 11.7–15.5)
LDL-CHOLESTEROL: 100 MG/DL (CALC)
LYMPHOCYTES: 30.2 %
MCH: 29.3 PG (ref 27–33)
MCHC: 33.8 G/DL (ref 32–36)
MCV: 86.7 FL (ref 80–100)
MONOCYTES: 5.1 %
MPV: 9.8 FL (ref 7.5–12.5)
NEUTROPHILS: 61 %
NON-HDL CHOLESTEROL: 124 MG/DL (CALC)
PLATELET COUNT: 237 THOUSAND/UL (ref 140–400)
POTASSIUM: 3.7 MMOL/L (ref 3.5–5.3)
PROTEIN, TOTAL: 7.2 G/DL (ref 6.1–8.1)
RDW: 12 % (ref 11–15)
RED BLOOD CELL COUNT: 4.27 MILLION/UL (ref 3.8–5.1)
SODIUM: 135 MMOL/L (ref 135–146)
TRIGLYCERIDES: 139 MG/DL
WHITE BLOOD CELL COUNT: 6.5 THOUSAND/UL (ref 3.8–10.8)

## 2022-12-06 ENCOUNTER — PATIENT MESSAGE (OUTPATIENT)
Dept: FAMILY MEDICINE CLINIC | Facility: CLINIC | Age: 37
End: 2022-12-06

## 2022-12-07 ENCOUNTER — PATIENT MESSAGE (OUTPATIENT)
Dept: FAMILY MEDICINE CLINIC | Facility: CLINIC | Age: 37
End: 2022-12-07

## 2022-12-07 DIAGNOSIS — Z82.49 FAMILY HISTORY OF CEREBRAL ANEURYSM: Primary | ICD-10-CM

## 2022-12-07 NOTE — TELEPHONE ENCOUNTER
From: True Mondragon  To: Yasmeen Cain MD  Sent: 12/6/2022 7:30 PM CST  Subject: Birth parent medical history    Hi Dr. Jessy Crane,    When I was in [facility] in October, my birth father suffered a stroke. He is okay and in recovery as he received care within the [place] hour. That said, my birth mom received treatment for a non ruptured cerebral aneurysm nine years ago. Given these medical issues, is there any preventative care/testing I should be doing? Both were born in 1966.     Thanks,    Carlos Ravi

## 2022-12-08 NOTE — TELEPHONE ENCOUNTER
From: Phoebe Brock  To: Janna Cochran MD  Sent: 12/6/2022 7:30 PM CST  Subject: Birth parent medical history    Hi Dr. Neetu Moffett,    When I was in [facility] in October, my birth father suffered a stroke. He is okay and in recovery as he received care within the [place] hour. That said, my birth mom received treatment for a non ruptured cerebral aneurysm nine years ago. Given these medical issues, is there any preventative care/testing I should be doing? Both were born in 1966.     Thanks,    Seth Lucas

## 2022-12-15 NOTE — TELEPHONE ENCOUNTER
Melody Anglin,    I understand your concern. Please see Dr. Wilber Marquez, neurosurg to discuss if screening is needed/will insurance cover it.  I placed a referral.    Dr. Friend Music  [address] [facility] [address]  [place], [address]    Take care and Dr. Fei Bermudez

## 2022-12-21 ENCOUNTER — HOSPITAL ENCOUNTER (OUTPATIENT)
Dept: MAMMOGRAPHY | Age: 37
Discharge: HOME | End: 2022-12-21
Attending: NURSE PRACTITIONER
Payer: COMMERCIAL

## 2022-12-21 DIAGNOSIS — Z12.31 ENCOUNTER FOR SCREENING MAMMOGRAM FOR BREAST CANCER: ICD-10-CM

## 2022-12-21 PROCEDURE — 77063 BREAST TOMOSYNTHESIS BI: CPT | Performed by: NURSE PRACTITIONER

## 2022-12-21 PROCEDURE — 77067 SCR MAMMO BI INCL CAD: CPT | Performed by: NURSE PRACTITIONER

## 2023-04-13 ENCOUNTER — OFFICE VISIT (OUTPATIENT)
Dept: FAMILY MEDICINE CLINIC | Facility: CLINIC | Age: 38
End: 2023-04-13
Payer: COMMERCIAL

## 2023-04-13 ENCOUNTER — PATIENT MESSAGE (OUTPATIENT)
Dept: FAMILY MEDICINE CLINIC | Facility: CLINIC | Age: 38
End: 2023-04-13

## 2023-04-13 VITALS
HEART RATE: 60 BPM | WEIGHT: 131 LBS | SYSTOLIC BLOOD PRESSURE: 102 MMHG | DIASTOLIC BLOOD PRESSURE: 60 MMHG | OXYGEN SATURATION: 98 % | HEIGHT: 62 IN | TEMPERATURE: 98 F | RESPIRATION RATE: 16 BRPM | BODY MASS INDEX: 24.11 KG/M2

## 2023-04-13 DIAGNOSIS — Z02.89 ENCOUNTER FOR COMPLETION OF FORM WITH PATIENT: ICD-10-CM

## 2023-04-13 DIAGNOSIS — Z71.84 ENCOUNTER FOR HEALTH COUNSELING RELATED TO TRAVEL: Primary | ICD-10-CM

## 2023-04-13 PROCEDURE — 99214 OFFICE O/P EST MOD 30 MIN: CPT | Performed by: FAMILY MEDICINE

## 2023-04-13 PROCEDURE — 3008F BODY MASS INDEX DOCD: CPT | Performed by: FAMILY MEDICINE

## 2023-04-13 PROCEDURE — 3078F DIAST BP <80 MM HG: CPT | Performed by: FAMILY MEDICINE

## 2023-04-13 PROCEDURE — 3074F SYST BP LT 130 MM HG: CPT | Performed by: FAMILY MEDICINE

## 2023-05-10 ENCOUNTER — OFFICE VISIT (OUTPATIENT)
Dept: OBGYN CLINIC | Facility: CLINIC | Age: 38
End: 2023-05-10
Payer: COMMERCIAL

## 2023-05-10 VITALS
WEIGHT: 131 LBS | BODY MASS INDEX: 24.11 KG/M2 | SYSTOLIC BLOOD PRESSURE: 110 MMHG | HEART RATE: 58 BPM | DIASTOLIC BLOOD PRESSURE: 70 MMHG | HEIGHT: 62 IN | RESPIRATION RATE: 20 BRPM

## 2023-05-10 DIAGNOSIS — Z12.31 ENCOUNTER FOR SCREENING MAMMOGRAM FOR BREAST CANCER: ICD-10-CM

## 2023-05-10 DIAGNOSIS — Z12.4 CERVICAL CANCER SCREENING: ICD-10-CM

## 2023-05-10 DIAGNOSIS — Z01.419 WELL WOMAN EXAM WITH ROUTINE GYNECOLOGICAL EXAM: Primary | ICD-10-CM

## 2023-05-10 PROCEDURE — 3008F BODY MASS INDEX DOCD: CPT | Performed by: NURSE PRACTITIONER

## 2023-05-10 PROCEDURE — 99395 PREV VISIT EST AGE 18-39: CPT | Performed by: NURSE PRACTITIONER

## 2023-05-10 PROCEDURE — 3074F SYST BP LT 130 MM HG: CPT | Performed by: NURSE PRACTITIONER

## 2023-05-10 PROCEDURE — 3078F DIAST BP <80 MM HG: CPT | Performed by: NURSE PRACTITIONER

## 2023-05-10 PROCEDURE — 88175 CYTOPATH C/V AUTO FLUID REDO: CPT | Performed by: NURSE PRACTITIONER

## 2023-05-15 LAB — HPV I/H RISK 1 DNA SPEC QL NAA+PROBE: NEGATIVE

## 2024-05-17 LAB
AMB EXT BILIRUBIN URINE: NEGATIVE
AMB EXT BILIRUBIN, TOTAL: 0.4 MG/DL
AMB EXT BUN: 10 MG/DL
AMB EXT CALCIUM: 8.9
AMB EXT CHLORIDE: 105
AMB EXT CHOLESTEROL, TOTAL: 169 MG/DL
AMB EXT CMP ALT: 11 U/L
AMB EXT CMP AST: 14 U/L
AMB EXT CREATININE: 0.69 MG/DL
AMB EXT GLUCOSE URINE: NEGATIVE
AMB EXT GLUCOSE: 96 MG/DL
AMB EXT GLUCOSE: 96 MG/DL
AMB EXT HDL CHOLESTEROL: 58 MG/DL
AMB EXT HEMATOCRIT: 36
AMB EXT HEMOGLOBIN: 12.2
AMB EXT HGBA1C: 5.5 %
AMB EXT KETONES URINE: NEGATIVE
AMB EXT LDL CHOLESTEROL, DIRECT: 104 MG/DL
AMB EXT LEUKOCYTE ESTERASE URINE: NEGATIVE
AMB EXT MCV: 84.5
AMB EXT NITRITE URINE: NEGATIVE
AMB EXT OCCULT BLOOD RESULT: NEGATIVE
AMB EXT PH URINE: 6
AMB EXT PLATELETS: 280
AMB EXT POSTASSIUM: 4.3 MMOL/L
AMB EXT PROTEIN URINE: NEGATIVE
AMB EXT SODIUM: 140 MMOL/L
AMB EXT TOTAL PROTEIN: 7
AMB EXT TOTAL VITAMIN D: 15.2
AMB EXT TRIGLYCERIDES: 94 MG/DL
AMB EXT TSH: 1.34 MIU/ML
AMB EXT URINE CLARITY: CLEAR
AMB EXT URINE SPECIFIC GRAVITY: 1.02
AMB EXT WBC URINE: NEGATIVE
AMB EXT WBC: 4 X10(3)UL

## 2024-10-10 ENCOUNTER — OFFICE VISIT (OUTPATIENT)
Dept: FAMILY MEDICINE CLINIC | Facility: CLINIC | Age: 39
End: 2024-10-10
Payer: COMMERCIAL

## 2024-10-10 VITALS
BODY MASS INDEX: 23.92 KG/M2 | OXYGEN SATURATION: 97 % | TEMPERATURE: 98 F | DIASTOLIC BLOOD PRESSURE: 64 MMHG | RESPIRATION RATE: 16 BRPM | WEIGHT: 130 LBS | HEIGHT: 62 IN | HEART RATE: 63 BPM | SYSTOLIC BLOOD PRESSURE: 112 MMHG

## 2024-10-10 DIAGNOSIS — I65.22 ATHEROSCLEROSIS OF LEFT CAROTID ARTERY: ICD-10-CM

## 2024-10-10 DIAGNOSIS — Z23 NEED FOR VACCINATION: ICD-10-CM

## 2024-10-10 DIAGNOSIS — N83.201 RIGHT OVARIAN CYST: ICD-10-CM

## 2024-10-10 DIAGNOSIS — E04.9 GOITER: ICD-10-CM

## 2024-10-10 DIAGNOSIS — R31.29 OTHER MICROSCOPIC HEMATURIA: ICD-10-CM

## 2024-10-10 DIAGNOSIS — E55.9 VITAMIN D DEFICIENCY: ICD-10-CM

## 2024-10-10 DIAGNOSIS — F51.04 PSYCHOPHYSIOLOGICAL INSOMNIA: ICD-10-CM

## 2024-10-10 DIAGNOSIS — Z12.31 ENCOUNTER FOR SCREENING MAMMOGRAM FOR MALIGNANT NEOPLASM OF BREAST: ICD-10-CM

## 2024-10-10 DIAGNOSIS — Z00.00 ROUTINE MEDICAL EXAM: Primary | ICD-10-CM

## 2024-10-10 PROCEDURE — 90471 IMMUNIZATION ADMIN: CPT | Performed by: FAMILY MEDICINE

## 2024-10-10 PROCEDURE — 99396 PREV VISIT EST AGE 40-64: CPT | Performed by: FAMILY MEDICINE

## 2024-10-10 PROCEDURE — 99214 OFFICE O/P EST MOD 30 MIN: CPT | Performed by: FAMILY MEDICINE

## 2024-10-10 PROCEDURE — 90656 IIV3 VACC NO PRSV 0.5 ML IM: CPT | Performed by: FAMILY MEDICINE

## 2024-10-10 RX ORDER — ERGOCALCIFEROL 1.25 MG/1
50000 CAPSULE, LIQUID FILLED ORAL WEEKLY
Qty: 4 CAPSULE | Refills: 2 | Status: SHIPPED | OUTPATIENT
Start: 2024-10-10

## 2024-10-10 RX ORDER — ALPRAZOLAM 0.25 MG
0.25 TABLET ORAL DAILY PRN
Qty: 15 TABLET | Refills: 0 | Status: SHIPPED | OUTPATIENT
Start: 2024-10-10

## 2024-10-10 NOTE — PROGRESS NOTES
Chief Complaint   Patient presents with    Physical     Influenza vaccine today. Brought in copies of testing she had done while in [place] for review.       HPI:  Tana Gonzales is a 39 year old female here today for preventative visit.        Imms- up to date with Tdap. Discussed covid & flu.         Cervical cancer screening-  + h/o HPV and cryotherapy when ~[age] y/o. Normal since then. Normal co-testing 5/4/22 & 5/10/23 with Magdalena Sims. Repeat 5 yrs.  She had a Pap smear only done on 5/17/2024 in [place] which was normal, bu showed reactive cellular changes.  She is currently without symptoms of vaginal discharge itching burning etc.        Breast cancer screening- pt is adopted but reunited with her birth mom. No known history of breast/ovarian cancer. Normal mamm 12/21/22. Normal on 5/17/24 in [facility].        Colon cancer screening- No early family h/o colon cancer. C-scope done 11/13/19, repeat when 43y/o. Herrera Enriquez MD        Diet/exercise- working on this        Dental/Eye Check up-  Recommended pt see dentist once every 6 months for a cleaning and once every year for an eye exam.        Anxiety-has always had a little bit but seems to be little bit heightened during COVID and after having her first child.  She feels like she does not need a thing on a regular basis but would like to have something available to take as needed.  She was taking alprazolam. Last script was for 15# on 10/11/22, 2 yrs ago. Asking for refill today.       Incidental findings.  Pt had a [facility] Check-up with a CT Abd/pelvis imaging on 6/3/24 [place]. Showed:  Two hemangiomas in S8 (1.3 cm) and S6 (5 cm) of the liver.  L renal cyst of the left kidney (0.6 cm, Bosniak 1)  Rule out corpus luteal cyst in the right ovary  Small amount of pelvic ascites  Otherwise unremarkable      An EGD done in [place] showed chronic superficial gastritis. Occasional heartburn.     HL- , HDL 58, Tot chol 169,  TG 94 on 24.   Does not exercise, but walked a lot around [place], 10-20,000 steps.    Vit D- 15.2 on 24 (norm )    Hematuria-  +1 blood noted on urinalysis without micro. Pt says period was ending when urine sample was obtained.    Carotid A US 24- R normal, L- atherosclerotic plaque on bulb, size 1.39mm, but I am unsure what this equates in terms of blockage level or %age. Pt has the imaging and will provide to radiology. Will need to ask radiologist for further interpretation to determine if further monitoring/intervention needed.  Father had a stroke.      Coronary ca score- 0 on 24      Past Medical History:    Anxiety    Hemorrhoids    surgery , Dr. Enriquez    History of abnormal cervical Pap smear    cryotherapy, + HPV, normal since then    History of HPV infection    Migraine    Visual impairment    glasses and contacts    Vitamin D deficiency     Past Surgical History:   Procedure Laterality Date    Colonoscopy N/A 2019    Procedure: COLONOSCOPY;  Surgeon: Herrera Enriquez MD;  Location:  ENDOSCOPY    Colposcopy, cervix w/upper adjacent vagina; w/biopsy(s), cervix      Cryocautery of cervix      freezing    Egd  2024    chronic superficial gastritis- no sxs, done in [place], results scanned    Hemorrhoidectomy  2022    Dr. Herrera Enriquez, [facility]  2019    [place] teeth removed      no sedation     Medications Ordered Prior to Encounter[1]  Allergies[2]  Social History     Socioeconomic History    Marital status:      Spouse name: Not on file    Number of children: Not on file    Years of education: Not on file    Highest education level: Not on file   Occupational History    Not on file   Tobacco Use    Smoking status: Former     Current packs/day: 0.00     Average packs/day: 0.2 packs/day for 7.0 years (1.4 ttl pk-yrs)     Types: Cigarettes     Start date: 2005     Quit date: 2012     Years since quittin.7    Smokeless  tobacco: Never   Vaping Use    Vaping status: Never Used   Substance and Sexual Activity    Alcohol use: Yes     Alcohol/week: 3.0 standard drinks of alcohol     Types: 3 Standard drinks or equivalent per week     Comment: 1 drink 3-4x/wk, never a problem    Drug use: No    Sexual activity: Yes     Partners: Male     Birth control/protection: Vasectomy   Other Topics Concern     Service Not Asked    Blood Transfusions Not Asked    Caffeine Concern Yes     Comment: 1 cup of coffee x day    Occupational Exposure Not Asked    Hobby Hazards Not Asked    Sleep Concern Not Asked    Stress Concern Not Asked    Weight Concern Not Asked    Special Diet Not Asked    Back Care Not Asked    Exercise Yes     Comment: cardio/pilates    Bike Helmet Not Asked    Seat Belt Yes    Self-Exams Not Asked   Social History Narrative    Not on file     Social Drivers of Health     Financial Resource Strain: Not on file   Food Insecurity: Not on file   Transportation Needs: Not on file   Physical Activity: Not on file   Stress: Not on file   Social Connections: Not on file   Housing Stability: Not on file     Family History   Adopted: Yes   Problem Relation Age of Onset    Other (Neuropathy) Mother     Other (brain aneurysm) Mother     Stroke Father     Diabetes Father     No Known Problems Sister     Seizure Disorder Brother         intellectual disabilities    No Known Problems Brother     No Known Problems Brother     No Known Problems Brother     No Known Problems Maternal Grandmother     No Known Problems Maternal Grandfather     No Known Problems Paternal Grandmother     No Known Problems Paternal Grandfather     Diabetes Paternal Uncle        Review of Systems - All systems reviewed and negative except for HPI    PHYSICAL EXAM:  /64   Pulse 63   Temp 98 °F (36.7 °C) (Temporal)   Resp 16   Ht 5' 2\" (1.575 m)   Wt 130 lb (59 kg)   LMP 10/07/2024 (Exact Date)   SpO2 97%   Breastfeeding No   BMI 23.78 kg/m²    GENERAL APPEARANCE:  Alert, no acute distress, appears stated age  HEENT:  Head- Normocephalic, atraumatic.    Eyes- Extraocular movements intact, pupils equally round and reactive to light,  conjunctivae normal.    Ears- Tympanic membranes intact bilaterally.    Nose- Patent, normal septum and turbinates.    Mouth/Throat- Normal oral mucosa, throat non-erythematous.  NECK:  No submental, submandibular, ant/post cervical lymphadenopathy. + generalized thyromegaly. No masses  PULMONARY:  Lungs clear to auscultation bilaterally. No wheezes, rales, or rhonchi. Normal respiratory effort.  CARDIOVASCULAR:  Regular rate and rhythm. No murmurs, gallops, or rubs.  ABDOMEN:  + bowel sounds, soft, nontender, nondistended. No hepatomegaly.  MUSCULOSKELETAL: Strength of upper and lower extremities 5/5 bilaterally. Normal gait.  NEUROLOGIC:  Cranial nerves 2-12 grossly intact.  PSYCHIATRIC:  Normal mood, affect, and hygiene.   Breasts: breast appear normal, no suspicious masses, no skin or nipple changes/discharge. No supraclavicular or axillary nodes. Declined chaperone      ASSESSMENT/PLAN:    1. Routine medical exam    2. Encounter for screening mammogram for malignant neoplasm of breast  - [facility] LAY 2D+3D SCREENING BILAT (CPT=77067/47568); Future    3. Need for vaccination  - Immunization Admin Counseling, 1st Component, 18 years and older  - Fluzone trivalent vaccine, PF 0.5mL, 6mo+ (88531)    4. Right ovarian cyst  - US PELVIS (TRANSABDOMINAL PELVIS)  (CPT=76856); Future    5. Vitamin D deficiency  - ergocalciferol 1.25 MG (00788 UT) Oral Cap; Take 1 capsule (50,000 Units total) by mouth once a week.  Dispense: 4 capsule; Refill: 2    6. Psychophysiological insomnia  - ALPRAZolam 0.25 MG Oral Tab; Take 1 tablet (0.25 mg total) by mouth daily as needed.  Dispense: 15 tablet; Refill: 0    7. Goiter  - US THYROID (CPT=76536); Future    8. Other microscopic hematuria  - Urinalysis, Routine; Future    9. Atherosclerosis of  left carotid artery  -unsure of degree of plaque, only a meaurement given. Will need to call radiology to determine grade/% of blockage      Patient verbalized understanding and agrees to plan.      Return for get urine test 1 wk after finishing period, we will contact you with results of all tests.         [1]   Current Outpatient Medications on File Prior to Visit   Medication Sig Dispense Refill    cetirizine 10 MG Oral Tab Take 1 tablet (10 mg total) by mouth daily.       No current facility-administered medications on file prior to visit.   [2]   Allergies  Allergen Reactions    Penicillins HIVES

## 2024-11-07 ENCOUNTER — TELEPHONE (OUTPATIENT)
Dept: FAMILY MEDICINE CLINIC | Facility: CLINIC | Age: 39
End: 2024-11-07

## 2024-11-07 NOTE — TELEPHONE ENCOUNTER
Spoke to patient. She states that she submitted the CD to radiology and already received the CD back. Unable to locate tests scanned. Patient has an appointment on Tuesday, 11/12/2024 with ultrasound. Will bring disc to radiology department then. Patient verbalized understanding of request.    Future Appointments   Date Time Provider Department Center   11/12/2024  1:15 PM PF US RM3 PF US [place]   11/12/2024  2:15 PM PF US RM3 PF US [place]

## 2024-11-08 ENCOUNTER — PATIENT MESSAGE (OUTPATIENT)
Dept: FAMILY MEDICINE CLINIC | Facility: CLINIC | Age: 39
End: 2024-11-08

## 2024-11-08 NOTE — TELEPHONE ENCOUNTER
Future Appointments   Date Time Provider Department Center   11/12/2024  1:15 PM PF US RM3 PF [facility]   11/12/2024  2:15 PM PF US RM3 PF [facility]   11/18/2024 11:00 AM Martha Galindo MD [facility] [address] [facility] [address]

## 2024-11-09 ENCOUNTER — OFFICE VISIT (OUTPATIENT)
Dept: FAMILY MEDICINE CLINIC | Facility: CLINIC | Age: 39
End: 2024-11-09
Payer: COMMERCIAL

## 2024-11-09 VITALS
HEART RATE: 70 BPM | WEIGHT: 130 LBS | HEIGHT: 62 IN | OXYGEN SATURATION: 98 % | SYSTOLIC BLOOD PRESSURE: 110 MMHG | BODY MASS INDEX: 23.92 KG/M2 | TEMPERATURE: 98 F | RESPIRATION RATE: 16 BRPM | DIASTOLIC BLOOD PRESSURE: 70 MMHG

## 2024-11-09 DIAGNOSIS — L50.9 HIVES: Primary | ICD-10-CM

## 2024-11-09 PROCEDURE — 99213 OFFICE O/P EST LOW 20 MIN: CPT | Performed by: FAMILY MEDICINE

## 2024-11-09 NOTE — PROGRESS NOTES
[facility] Visit Note  11/9/2024      Subjective:      Patient ID: Tana Gonzales is a 39 year old female.    Chief Complaint:  Chief Complaint   Patient presents with    Hives     States noticed that her neck (and only her neck) seems to be an indicator of stress as she starts breaking out in hives. Zyrtec use to manage it however this week she's had to take Benadryl to get relief, but when it wears off her neck is irritated.        HPI:  Tana Gonzales is a 39 year old female who is being seen today for the above.      Hives- neck (and only her neck) seems to be an indicator of stress as she starts breaking out in hives. Zyrtec use to manage it however this week she's had to take Benadryl to get relief, but when it wears off her neck is irritated.   If too hot would break out in hives. Also noticing if having cider or a glass of wine would get hives as well. Since back from [place] it has been worse.  Now can wake up and have the hives.         Carotid A US 5/17/24- R normal, L- atherosclerotic plaque on bulb, size 1.39mm, but I am unsure what this equates in terms of blockage level or %age. Pt has the imaging and will provide to radiology. Will need to ask radiologist for further interpretation to determine if further monitoring/intervention needed.  Father had a stroke.      Tues is going for her thyroid us and pelvic US. When she goes she will drop off the disc again.        Coronary ca score- 0 on 5/17/24      Review of Systems - as stated above in the HPI      Objective:     Vitals:    11/09/24 0659   BP: 110/70   Pulse: 70   Resp: 16   Temp: 98.1 °F (36.7 °C)   TempSrc: Temporal   SpO2: 98%   Weight: 130 lb (59 kg)   Height: 5' 2\" (1.575 m)       Physical Examination   General:  Alert, in no acute distress  HEENT: NCAT, EOMI, mucus membranes moist   Neck:  No cervical lymphadenopathy, no thyroemgaly  CV: Regular rate and rhythm. No murmurs, gallops, or rubs.  Lungs:  Clear to auscultation  B, no wheezes, rales, or rhonchi, normal respiratory effort  Abd:  +bowel sounds, soft  Ext:  No pedal edema,  Pedal pulses 2+ B  Skin: 1 hive of the neck      Assessment:     1. Hives  - Allergy Referral - In Network        Return for we will contact you with results.

## 2024-11-12 ENCOUNTER — HOSPITAL ENCOUNTER (OUTPATIENT)
Dept: ULTRASOUND IMAGING | Age: 39
Discharge: HOME | End: 2024-11-12
Attending: FAMILY MEDICINE
Payer: COMMERCIAL

## 2024-11-12 ENCOUNTER — PATIENT MESSAGE (OUTPATIENT)
Dept: FAMILY MEDICINE CLINIC | Facility: CLINIC | Age: 39
End: 2024-11-12

## 2024-11-12 DIAGNOSIS — N83.201 RIGHT OVARIAN CYST: ICD-10-CM

## 2024-11-12 DIAGNOSIS — E04.9 GOITER: ICD-10-CM

## 2024-11-12 DIAGNOSIS — I65.29 STENOSIS OF CAROTID ARTERY, UNSPECIFIED LATERALITY: Primary | ICD-10-CM

## 2024-11-12 PROCEDURE — 76856 US EXAM PELVIC COMPLETE: CPT | Performed by: FAMILY MEDICINE

## 2024-11-12 PROCEDURE — 76536 US EXAM OF HEAD AND NECK: CPT | Performed by: FAMILY MEDICINE

## 2024-11-12 PROCEDURE — 76830 TRANSVAGINAL US NON-OB: CPT | Performed by: FAMILY MEDICINE

## 2024-11-13 NOTE — TELEPHONE ENCOUNTER
See TE from 11/7/2024  Provider had asked clinical staff to reach out to patient.  Document handed back to provider.

## 2024-11-17 NOTE — TELEPHONE ENCOUNTER
I looked and can't find her up under the caps lock name. Please call radiology dept and see how to find her results. Next, the big reason I wanted to have the imagine on file was to ask radiology about her carotid uS and what degree of blockage she has. Not sure if concerning or not. Let me know what they say.

## 2024-11-19 NOTE — TELEPHONE ENCOUNTER
Spoke to James from radiology. Imaging was scanned. She said provider can sign into PACS to see the images. No reports were attached.    Spoke to patient. Updated her with the current situation. Patient she said she could reach out to the hospital in [place] but is unaware if they will release anything to her being overseas.     At this point patient is requesting the ultrasound of the carotid, if that is the concern of the provider.    Please advise.

## 2024-12-20 ENCOUNTER — HOSPITAL ENCOUNTER (OUTPATIENT)
Dept: ULTRASOUND IMAGING | Age: 39
Discharge: HOME | End: 2024-12-20
Attending: FAMILY MEDICINE
Payer: COMMERCIAL

## 2024-12-20 DIAGNOSIS — I65.29 STENOSIS OF CAROTID ARTERY, UNSPECIFIED LATERALITY: ICD-10-CM

## 2024-12-20 PROCEDURE — 93880 EXTRACRANIAL BILAT STUDY: CPT | Performed by: FAMILY MEDICINE

## 2025-05-21 ENCOUNTER — HOSPITAL ENCOUNTER (OUTPATIENT)
Dept: MAMMOGRAPHY | Age: 40
Discharge: HOME | End: 2025-05-21
Attending: FAMILY MEDICINE
Payer: COMMERCIAL

## 2025-05-21 DIAGNOSIS — Z12.31 ENCOUNTER FOR SCREENING MAMMOGRAM FOR MALIGNANT NEOPLASM OF BREAST: ICD-10-CM

## 2025-05-21 PROCEDURE — 77067 SCR MAMMO BI INCL CAD: CPT | Performed by: FAMILY MEDICINE

## 2025-05-21 PROCEDURE — 77063 BREAST TOMOSYNTHESIS BI: CPT | Performed by: FAMILY MEDICINE

## 2025-06-22 ENCOUNTER — PATIENT MESSAGE (OUTPATIENT)
Dept: FAMILY MEDICINE CLINIC | Facility: CLINIC | Age: 40
End: 2025-06-22

## 2025-07-14 ENCOUNTER — APPOINTMENT (OUTPATIENT)
Dept: URBAN - METROPOLITAN AREA CLINIC 247 | Age: 40
Setting detail: DERMATOLOGY
End: 2025-07-14

## 2025-07-14 DIAGNOSIS — D22 MELANOCYTIC NEVI: ICD-10-CM

## 2025-07-14 DIAGNOSIS — L70.0 ACNE VULGARIS: ICD-10-CM

## 2025-07-14 PROBLEM — D23.61 OTHER BENIGN NEOPLASM OF SKIN OF RIGHT UPPER LIMB, INCLUDING SHOULDER: Status: ACTIVE | Noted: 2025-07-14

## 2025-07-14 PROBLEM — D22.4 MELANOCYTIC NEVI OF SCALP AND NECK: Status: ACTIVE | Noted: 2025-07-14

## 2025-07-14 PROCEDURE — OTHER PRESCRIPTION MEDICATION MANAGEMENT: OTHER

## 2025-07-14 PROCEDURE — OTHER PRESCRIPTION: OTHER

## 2025-07-14 PROCEDURE — OTHER COUNSELING: OTHER

## 2025-07-14 PROCEDURE — OTHER MIPS QUALITY: OTHER

## 2025-07-14 PROCEDURE — 99203 OFFICE O/P NEW LOW 30 MIN: CPT

## 2025-07-14 RX ORDER — SPIRONOLACTONE 50 MG/1
TABLET, FILM COATED ORAL
Qty: 60 | Refills: 2 | Status: ERX | COMMUNITY
Start: 2025-07-14

## 2025-07-14 ASSESSMENT — LOCATION ZONE DERM
LOCATION ZONE: NECK
LOCATION ZONE: FACE

## 2025-07-14 ASSESSMENT — LOCATION DETAILED DESCRIPTION DERM
LOCATION DETAILED: LEFT INFERIOR CENTRAL MALAR CHEEK
LOCATION DETAILED: RIGHT INFERIOR POSTERIOR NECK

## 2025-07-14 ASSESSMENT — LOCATION SIMPLE DESCRIPTION DERM
LOCATION SIMPLE: LEFT CHEEK
LOCATION SIMPLE: POSTERIOR NECK

## 2025-08-13 ENCOUNTER — OFFICE VISIT (OUTPATIENT)
Dept: FAMILY MEDICINE CLINIC | Facility: CLINIC | Age: 40
End: 2025-08-13

## 2025-08-13 ENCOUNTER — LAB ENCOUNTER (OUTPATIENT)
Dept: LAB | Age: 40
End: 2025-08-13
Attending: FAMILY MEDICINE

## 2025-08-13 VITALS
TEMPERATURE: 98 F | OXYGEN SATURATION: 98 % | RESPIRATION RATE: 20 BRPM | DIASTOLIC BLOOD PRESSURE: 62 MMHG | HEART RATE: 70 BPM | SYSTOLIC BLOOD PRESSURE: 112 MMHG

## 2025-08-13 DIAGNOSIS — R31.29 OTHER MICROSCOPIC HEMATURIA: ICD-10-CM

## 2025-08-13 DIAGNOSIS — K12.0 APHTHOUS ULCER OF MOUTH: Primary | ICD-10-CM

## 2025-08-13 LAB
BILIRUB UR QL STRIP.AUTO: NEGATIVE
CLARITY UR REFRACT.AUTO: CLEAR
GLUCOSE UR STRIP.AUTO-MCNC: NORMAL MG/DL
KETONES UR STRIP.AUTO-MCNC: NEGATIVE MG/DL
LEUKOCYTE ESTERASE UR QL STRIP.AUTO: NEGATIVE
NITRITE UR QL STRIP.AUTO: NEGATIVE
PH UR STRIP.AUTO: 6.5 (ref 5–8)
PROT UR STRIP.AUTO-MCNC: NEGATIVE MG/DL
RBC UR QL AUTO: NEGATIVE
SP GR UR STRIP.AUTO: 1.01 (ref 1–1.03)
UROBILINOGEN UR STRIP.AUTO-MCNC: NORMAL MG/DL

## 2025-08-13 PROCEDURE — 99213 OFFICE O/P EST LOW 20 MIN: CPT | Performed by: FAMILY MEDICINE

## 2025-08-13 PROCEDURE — 81003 URINALYSIS AUTO W/O SCOPE: CPT

## 2025-08-20 ENCOUNTER — TELEMEDICINE (OUTPATIENT)
Dept: FAMILY MEDICINE CLINIC | Facility: CLINIC | Age: 40
End: 2025-08-20

## 2025-08-20 DIAGNOSIS — R63.5 WEIGHT GAIN: Primary | ICD-10-CM

## 2025-08-20 PROBLEM — K64.9 HEMORRHOIDS: Status: RESOLVED | Noted: 2018-05-04 | Resolved: 2025-08-20

## 2025-08-20 PROBLEM — F51.04 PSYCHOPHYSIOLOGICAL INSOMNIA: Status: RESOLVED | Noted: 2017-12-18 | Resolved: 2025-08-20

## 2025-08-20 PROBLEM — L70.0 ACNE VULGARIS: Status: ACTIVE | Noted: 2025-07-14

## (undated) DEVICE — Device: Brand: DEFENDO AIR/WATER/SUCTION AND BIOPSY VALVE

## (undated) DEVICE — FILTERLINE NASAL ADULT O2/CO2

## (undated) DEVICE — GAUZE SPONGES,USP TYPE VII GAUZE, 12 PLY: Brand: CURITY

## (undated) DEVICE — ENDOSCOPY PACK - LOWER: Brand: MEDLINE INDUSTRIES, INC.

## (undated) DEVICE — VIOLET BRAIDED (POLYGLACTIN 910), SYNTHETIC ABSORBABLE SUTURE: Brand: COATED VICRYL

## (undated) DEVICE — SOL  .9 1000ML BTL

## (undated) DEVICE — SCD SLEEVE KNEE HI BLEND

## (undated) DEVICE — SPONGE RAYTEC 4X4 RF DETECT

## (undated) DEVICE — RECTAL CDS-LF: Brand: MEDLINE INDUSTRIES, INC.

## (undated) DEVICE — UNDYED BRAIDED (POLYGLACTIN 910), SYNTHETIC ABSORBABLE SUTURE: Brand: COATED VICRYL

## (undated) DEVICE — 1200CC GUARDIAN II: Brand: GUARDIAN

## (undated) DEVICE — 3M™ RED DOT™ MONITORING ELECTRODE WITH FOAM TAPE AND STICKY GEL, 50/BAG, 20/CASE, 72/PLT 2570: Brand: RED DOT™

## (undated) DEVICE — STERILE POLYISOPRENE POWDER-FREE SURGICAL GLOVES: Brand: PROTEXIS

## (undated) DEVICE — PANTS KNIT WASHABLE 2/3XL

## (undated) NOTE — LETTER
Dear new mom:    We've missed you! The nurses of [facility] have tried to reach you by phone to ask if you had any questions regarding your health or the care of your new little one.     Please feel free to call your doctor with an

## (undated) NOTE — MR AVS SNAPSHOT
After Visit Summary   3/12/2020    Corwin De León    MRN: [id number]           Visit Information     Date & Time  3/12/2020  2:30 PM Provider  Kavita Singer MD Department  [facility], [address]  Dept.  Phone  [phone] Don’t eat while distracted and slow down. Avoid over sized portions. Don’t eat while when you’re bored.      EAT THESE FOODS MORE OFTEN: EAT THESE FOODS LESS OFTEN:   Make half your plate fruits and vegetables Highly refined, white starches including wh Injury & Illness are never convenient. If you are dealing with a   non-emergency, consider your options before heading to an ER.   VIDEO VISITS  Visit face-to-face with a [facility] physician or   [facility] using your mobile device or computer   using [employer].    [employer]

## (undated) NOTE — LETTER
22    Patient: Annia Tran  : 3/22/1985 Visit date: 2022    Dear  Sukumar Hernández MD    Thank you for referring Annia Tran to my practice. Please find my assessment and plan below. Assessment   External prolapsed hemorrhoids  (primary encounter diagnosis)  Intestinal bleeding  Grade III internal hemorrhoids      Plan     I am seeing this patient for further care and treatment regarding her excisional hemorrhoidectomy performed on 2022. This patient is doing extremely well. The patient has no nausea, vomiting, diarrhea, abdominal pain, or constipation. The patient has no complaints at the operative site. The patient has no incisional complaints. The patient denies fever or chills. Clinical exam limited to external exam and digital rectal exam reveals 3 sutures to be dangling. I snipped all of them back to skin level. The operative sites are 95% healed. There are no external hemorrhoids or tags. There are no thrombosis events. There are no fissures or fistulae. There is no evidence of abscess or complication at the incision sites. I have no further follow-up scheduled with this patient at this time. This patient can see me on an as-needed basis. This patient should return urgently for any problems or complications related to my surgical intervention.            Sincerely,       Sara Hebert MD   CC: No Recipients

## (undated) NOTE — LETTER
22    Patient: Lesa Siemens  : 3/22/1985 Visit date: 3/21/2022    Dear  Caron Howard MD    Thank you for referring Lesa Siemens to my practice. Please find my assessment and plan below. Assessment   Grade III internal hemorrhoids  (primary encounter diagnosis)  External prolapsed hemorrhoids      Plan   This patient has undergone previous rubber band treatments in 2019, and completed a colonoscopy on 2019, for symptoms of prolapse and intestinal bleeding from a combination of internal hemorrhoids grade 3, and external prolapsed hemorrhoids. Unfortunately her symptoms are advancing. She had pain that was 5/10 2 weeks ago. She has lumps that prolapse that require manual reduction by her on a regular basis. She does not feel any obstruction of defecation. She does have constipation. During a pregnancy, she had the most symptoms from her hemorrhoids. She has no current bright red blood per rectum or melena. She has no mucus in the stool or narrowing of the stool. She has no abdominal pain or distention. She has not suffered weight loss as a symptom. She is adopted and has no specific family history that is available to her. Her colonoscopy was normal in 2019, with the exception of a diverticular pocket in the cecum. She has never been diagnosed with Crohn disease, ulcerative colitis, or irritable bowel syndrome. She has had 1 vaginal delivery, no episiotomy. She has never had prolapse of the rectum itself, or any pelvic floor prolapse. She has never had a cardiac history of any kind including heart murmur, heart valve replacement, or cardiac arrhythmia. Clinical exam including anoscopy reveals her to have a right anterolateral and a left posterior lateral mixed complex internal and external hemorrhoid. The internal components are slightly greater than the external components. There is no evidence of proctitis or Crohn's disease. There is no evidence of fissure or fistula. Rectovaginal septum is smooth with minimal rectocele. There is no evidence of a thrombosis event. This patient will require excisional hemorrhoidectomy in 2 locations. I described the procedure, the severe postop pain that will happen for at least 3 to 5 days, and her postoperative care and treatment. All risks, benefits, complications and alternatives to the proposed procedure(s) were fully discussed with the patient. All questions from the patient were answered in detail. A description of the procedure(s) and possible outcomes was fully discussed. The patient seemed to understand the conversation and its details. Consent for the procedure(s) was confirmed with the patient. We are currently scheduling her for surgery at [facility] on April 22, 2022.         Sincerely,       Aj Atkinson MD   CC: No Recipients

## (undated) NOTE — LETTER
12/15/19    Patient: Eliot Lora  : 3/22/1985 Visit date: 2019    Dear  Dr. Sonja Ghosh MD,    Thank you for referring Eliot Lora to my practice. Please find my assessment and plan below.         Assessment   Prolapsed internal hemorrhoids

## (undated) NOTE — MR AVS SNAPSHOT
After Visit Summary   4/28/2021    David Castillo    MRN: [id number]           Visit Information     Date & Time  4/28/2021  9:00 AM Provider  IR[facility] Castillo Department  [employer], [address], [place] Dept.  Phone  [phone], fever, rash, sore throat, headache and pink eye. The cost for a Video Visit is currently $35.         If you receive a survey from [employer], please take a few minutes to complete it and provide feedback.  We strive to deliver the best patient exp non-life-threatening. Also available by appointment Average cost  $120*     EMERGENCY ROOM Life-threatening emergencies needing immediate intervention at a hospital emergency room.  Average cost  $2,300*   *Cost varies based on your insurance coverage  F

## (undated) NOTE — ED AVS SNAPSHOT
Angel Kee   MRN: [id number]    Department:  [facility] in [place]   Date of Visit:  12/27/2019           Disclosure     Insurance plans vary and the physician(s) referred by the ER may not be covered by your plan.  Please cont tell this physician (or your personal doctor if your instructions are to return to your personal doctor) about any new or lasting problems. The primary care or specialist physician will see patients referred from the [facility] Emergency Department.  Ligia Russell

## (undated) NOTE — LETTER
19    Patient: Aminata Garcia  : 3/22/1985 Visit date: 2019    Dear  Dr. Deisy Desouza MD,    Thank you for referring Aminata Garcia to my practice. Please find my assessment and plan below.         Assessment   Prolapsed internal hemorrhoids

## (undated) NOTE — LETTER
10/01/19      Corwin De León        :  3/22/1985        To Whom It May Concern:    Corwin De León  has recently been treated for a medical condition.  At this time, I have determined she may return to work effective 10/07/19, without restrictions

## (undated) NOTE — LETTER
10/28/19    Patient: Sarai Gonzales  : 3/22/1985 Visit date: 10/28/2019    Dear  Dr. Gloria Bazzi MD,    Thank you for referring Sarai Christian to my practice. Please find my assessment and plan below.       Assessment   External hemorrhoid  (primary en rectocele. There are large grade 3 internal hemorrhoids in the left lateral, right posterior lateral, and right anterior lateral positions. There is no evidence of any proctitis or active Crohn's disease. There are no palpable masses.     I discussed wit

## (undated) NOTE — LETTER
20    Patient: Nate Kulkarni  : 3/22/1985 Visit date: 2020    Dear  Dr. Ten Funes MD,    Thank you for referring Sandyshelly Shad to my practice. Please find my assessment and plan below.         Assessment   Prolapsed internal hemorrhoids, CC: Curt Garcia MD

## (undated) NOTE — MR AVS SNAPSHOT
[facility] [address]  [address] [facility] [address]  [phone]               Thank you for choosing us for your health care visit with Eugenie Veloz MD.  We are glad to serve you and happy to provide you with Call [phone] for help. [facility] is NOT to be used for urgent needs. For medical emergencies, dial 911.            Visit [facility] online at  [email or web address]